# Patient Record
Sex: MALE | Race: ASIAN | NOT HISPANIC OR LATINO
[De-identification: names, ages, dates, MRNs, and addresses within clinical notes are randomized per-mention and may not be internally consistent; named-entity substitution may affect disease eponyms.]

---

## 2020-09-14 ENCOUNTER — APPOINTMENT (OUTPATIENT)
Dept: UROLOGY | Facility: CLINIC | Age: 80
End: 2020-09-14
Payer: MEDICARE

## 2020-09-14 VITALS
HEIGHT: 69 IN | HEART RATE: 77 BPM | DIASTOLIC BLOOD PRESSURE: 82 MMHG | BODY MASS INDEX: 27.11 KG/M2 | WEIGHT: 183 LBS | SYSTOLIC BLOOD PRESSURE: 195 MMHG

## 2020-09-14 VITALS — TEMPERATURE: 97.3 F

## 2020-09-14 DIAGNOSIS — R31.0 GROSS HEMATURIA: ICD-10-CM

## 2020-09-14 DIAGNOSIS — N52.9 MALE ERECTILE DYSFUNCTION, UNSPECIFIED: ICD-10-CM

## 2020-09-14 DIAGNOSIS — Z78.9 OTHER SPECIFIED HEALTH STATUS: ICD-10-CM

## 2020-09-14 DIAGNOSIS — Z86.79 PERSONAL HISTORY OF OTHER DISEASES OF THE CIRCULATORY SYSTEM: ICD-10-CM

## 2020-09-14 PROCEDURE — 99204 OFFICE O/P NEW MOD 45 MIN: CPT

## 2020-09-14 RX ORDER — ASPIRIN 81 MG
81 TABLET, DELAYED RELEASE (ENTERIC COATED) ORAL
Refills: 0 | Status: ACTIVE | COMMUNITY

## 2020-09-15 NOTE — LETTER BODY
[FreeTextEntry1] : John Willson MD\par [address] [facility]\[place] [address]\par \par Dear Dr. Willson,\par \par Canelo Mathews presents to the office today.  He is a 80-year-old man who is here today regarding evaluation for hematuria.  He initially had an episode of gross hematuria 2 weeks ago while urinating. Says he passed a few clots into the bowl but was not accompanied by pain or discomfort. No fever, chills, nausea/vomiting, or flank pain. Never had gross hematuria in the past. The gross hematuria resolved in approximately 4 days from that and he continued to be free of any urologic symptoms. He visited his primary doctor, Dr. Fazal Willson for evaluation of his gross hematuria early September. Worked up w/ UA demonstrating 60 RBC/hpf with no WBC or bacteria seen on 9/3/20. Had a renal/bladder ultrasound performed as well as part of workup demonstrating 2 right renal cysts, 3.9 cm benign appearing and a 1cm simple cyst w/ a 90cc prostate.  The patient also reports a secondary complaint of erectile dysfunction.  He says that he has noticed decline in his erectile function over the last several years.  He is interested in hearing about different treatment options.\par \par He today has  no dysuria, increased urinary frequency, increased urgency, or flank pain. He has no symptoms of urinary obstruction such as weak stream, urinary hesitancy, or frequent nocturia. No prior history of kidney stones or urologic malignancy. No family history of urologic or nonurologic malignancies. He is a former smoker, however quit almost 50 years ago. Is retired but was previously a  and has no known occupational chiemical exposures.\par \par Based on his history, risk factors, and recent gross hematuria, I would recommend further evaluation including CT urogram and cystoscopy to assess the upper and lower urinary tract for any evidence of malignancy or stone disease.  We also discussed the wide range of conditions potentially associated with hematuria including BPH, urinary tract infection, stones, malignancy within the urinary tract, vascular disease or inflammatory conditions.. I explained to him that while he has no symptoms of BPH, his large prostate may be the source of his bleeding. Will plan to repeat UA today in clinic with a cytology. Furthermore, will plan to better image the upper tract with a CT urogram as well as perform a cystoscopy. Counseled the patients regarding this plan and his questions were answered.\par \par I reviewed potential treatment options for the erectile dysfunction he described to me today.  I offered him initial treatment with an oral PDE 5 inhibitor although he states that he would prefer not to use any medical treatments at this time.  He will let me know of his decision changes.\par \par I will update you again once he has had the CT scan and cystoscopy.  Please do not hesitate to contact me with any questions or concerns and thank you very much for the kind referral.\par \par Sincerely,\par \par \par \par \par Aren Mcgill MD, FACS\par  of Urology\par Residency \par [facility]\par \par [facility]\par Director of Robotics and Minimally Invasive Surgery\par [address] [place] [address]\[place] [address]\par P: [phone]\par F: [phone]\par [facility]\par \par \par

## 2020-09-16 LAB
APPEARANCE: CLEAR
BACTERIA UR CULT: NORMAL
BACTERIA: NEGATIVE
BILIRUBIN URINE: NEGATIVE
BLOOD URINE: NORMAL
COLOR: YELLOW
GLUCOSE QUALITATIVE U: ABNORMAL
HYALINE CASTS: 0 /LPF
KETONES URINE: NEGATIVE
LEUKOCYTE ESTERASE URINE: ABNORMAL
MICROSCOPIC-UA: NORMAL
NITRITE URINE: NEGATIVE
PH URINE: 6
PROTEIN URINE: NORMAL
RED BLOOD CELLS URINE: 1 /HPF
SPECIFIC GRAVITY URINE: 1.02
SQUAMOUS EPITHELIAL CELLS: 0 /HPF
URINE CYTOLOGY: NORMAL
UROBILINOGEN URINE: NORMAL
WHITE BLOOD CELLS URINE: 12 /HPF

## 2020-09-17 ENCOUNTER — RESULT REVIEW (OUTPATIENT)
Age: 80
End: 2020-09-17

## 2020-09-17 ENCOUNTER — OUTPATIENT (OUTPATIENT)
Dept: OUTPATIENT SERVICES | Facility: HOSPITAL | Age: 80
LOS: 1 days | End: 2020-09-17
Payer: COMMERCIAL

## 2020-09-17 ENCOUNTER — APPOINTMENT (OUTPATIENT)
Dept: CT IMAGING | Facility: IMAGING CENTER | Age: 80
End: 2020-09-17
Payer: MEDICARE

## 2020-09-17 DIAGNOSIS — R31.0 GROSS HEMATURIA: ICD-10-CM

## 2020-09-17 PROCEDURE — 82565 ASSAY OF CREATININE: CPT

## 2020-09-17 PROCEDURE — 74178 CT ABD&PLV WO CNTR FLWD CNTR: CPT | Mod: 26

## 2020-09-17 PROCEDURE — 74178 CT ABD&PLV WO CNTR FLWD CNTR: CPT

## 2021-01-26 ENCOUNTER — APPOINTMENT (OUTPATIENT)
Dept: CARDIOLOGY | Facility: CLINIC | Age: 81
End: 2021-01-26
Payer: MEDICARE

## 2021-01-26 ENCOUNTER — NON-APPOINTMENT (OUTPATIENT)
Age: 81
End: 2021-01-26

## 2021-01-26 VITALS
WEIGHT: 183 LBS | HEART RATE: 77 BPM | OXYGEN SATURATION: 99 % | BODY MASS INDEX: 27.02 KG/M2 | DIASTOLIC BLOOD PRESSURE: 80 MMHG | SYSTOLIC BLOOD PRESSURE: 138 MMHG | TEMPERATURE: 97.8 F

## 2021-01-26 VITALS — SYSTOLIC BLOOD PRESSURE: 110 MMHG | DIASTOLIC BLOOD PRESSURE: 80 MMHG

## 2021-01-26 PROCEDURE — 93000 ELECTROCARDIOGRAM COMPLETE: CPT

## 2021-01-26 PROCEDURE — 99072 ADDL SUPL MATRL&STAF TM PHE: CPT

## 2021-01-26 PROCEDURE — 99205 OFFICE O/P NEW HI 60 MIN: CPT

## 2021-01-26 RX ORDER — METFORMIN HYDROCHLORIDE 500 MG/1
500 TABLET, COATED ORAL
Refills: 0 | Status: DISCONTINUED | COMMUNITY
End: 2021-01-26

## 2021-01-26 RX ORDER — SIMVASTATIN 20 MG/1
20 TABLET, FILM COATED ORAL
Refills: 0 | Status: DISCONTINUED | COMMUNITY
End: 2021-01-26

## 2021-01-27 NOTE — REVIEW OF SYSTEMS
[see HPI] : see HPI [Dyspnea on exertion] : dyspnea during exertion [Chest Pain] : chest pain [Palpitations] : palpitations [Negative] : Heme/Lymph

## 2021-01-27 NOTE — DISCUSSION/SUMMARY
[FreeTextEntry1] : He is a 72-year-old with a history of remote coronary artery disease status post stenting, diabetes mellitus, hypertension, hyperlipidemia and a recent episode of what appears to be a TIA.\par No residual neurologic deficits are seen today.\par He was referred to neurology.  We discussed the importance of consistent medical therapy and I stressed the need for him to take his aspirin on a daily basis.  Lipitor 40 mg was prescribed for his recent CNS event.\par Plan for echocardiography in order to exclude any structural abnormalities as the source of his TIA.\par Optimization of his medical issues is suggested prior to any further testing.\par Care plan reviewed with his daughter.\par I also reviewed his plan with Dr. Rk Mayo neurology.

## 2021-01-27 NOTE — HISTORY OF PRESENT ILLNESS
[FreeTextEntry1] : He sought cardiovascular evaluation.\par He was recently diagnosed with COVID-19 on January 2, 2021.  This past Sunday, he reported experiencing slurred speech possibly right facial droop that was noticed by his wife.  The episode lasted for less than 15 minutes and resolved spontaneously.  His blood pressure earlier that morning was reported as 170/112 with a blood sugar of 138 in the early morning.  He had not had an episode similar to this in the past.\par He has a history of coronary artery disease status post coronary stenting 10 years ago.  He is unclear whether this was in the setting of a myocardial infarction or not.\par He has been dealing with insulin requiring diabetes mellitus for 15 years.\par He is accompanied by his daughter who assists with translation.\par He has no history of congestive heart failure, known stroke and quit smoking many years ago.\par He takes medication for BPH and reports some lightheadedness or dizziness when standing up quickly.\par

## 2021-01-27 NOTE — PHYSICAL EXAM
[General Appearance - Well Developed] : well developed [General Appearance - Well Nourished] : well nourished [General Appearance - In No Acute Distress] : no acute distress [Normal Conjunctiva] : the conjunctiva exhibited no abnormalities [Normal Jugular Venous V Waves Present] : normal jugular venous V waves present [Heart Sounds] : normal S1 and S2 [Arterial Pulses Normal] : the arterial pulses were normal [Edema] : no peripheral edema present [Regular] : the rhythm was regular [Respiration, Rhythm And Depth] : normal respiratory rhythm and effort [Auscultation Breath Sounds / Voice Sounds] : lungs were clear to auscultation bilaterally [Abdomen Soft] : soft [Bowel Sounds] : normal bowel sounds [Abnormal Walk] : normal gait [Cyanosis, Localized] : no localized cyanosis [Skin Turgor] : normal skin turgor [Oriented To Time, Place, And Person] : oriented to person, place, and time [Affect] : the affect was normal

## 2021-02-04 ENCOUNTER — APPOINTMENT (OUTPATIENT)
Dept: VASCULAR SURGERY | Facility: CLINIC | Age: 81
End: 2021-02-04
Payer: MEDICARE

## 2021-02-04 VITALS
WEIGHT: 180 LBS | SYSTOLIC BLOOD PRESSURE: 147 MMHG | BODY MASS INDEX: 25.77 KG/M2 | TEMPERATURE: 97.81 F | HEART RATE: 69 BPM | DIASTOLIC BLOOD PRESSURE: 84 MMHG | HEIGHT: 70 IN

## 2021-02-04 VITALS — SYSTOLIC BLOOD PRESSURE: 138 MMHG | DIASTOLIC BLOOD PRESSURE: 79 MMHG | HEART RATE: 68 BPM

## 2021-02-04 PROCEDURE — 99072 ADDL SUPL MATRL&STAF TM PHE: CPT

## 2021-02-04 PROCEDURE — 99204 OFFICE O/P NEW MOD 45 MIN: CPT

## 2021-02-04 PROCEDURE — 93880 EXTRACRANIAL BILAT STUDY: CPT

## 2021-02-05 ENCOUNTER — OUTPATIENT (OUTPATIENT)
Dept: OUTPATIENT SERVICES | Facility: HOSPITAL | Age: 81
LOS: 1 days | End: 2021-02-05
Payer: COMMERCIAL

## 2021-02-05 ENCOUNTER — APPOINTMENT (OUTPATIENT)
Dept: CT IMAGING | Facility: IMAGING CENTER | Age: 81
End: 2021-02-05
Payer: MEDICARE

## 2021-02-05 ENCOUNTER — RESULT REVIEW (OUTPATIENT)
Age: 81
End: 2021-02-05

## 2021-02-05 DIAGNOSIS — I65.22 OCCLUSION AND STENOSIS OF LEFT CAROTID ARTERY: ICD-10-CM

## 2021-02-05 DIAGNOSIS — Z00.8 ENCOUNTER FOR OTHER GENERAL EXAMINATION: ICD-10-CM

## 2021-02-05 PROCEDURE — 70498 CT ANGIOGRAPHY NECK: CPT

## 2021-02-05 PROCEDURE — 82565 ASSAY OF CREATININE: CPT

## 2021-02-05 PROCEDURE — 70498 CT ANGIOGRAPHY NECK: CPT | Mod: 26

## 2021-02-17 ENCOUNTER — APPOINTMENT (OUTPATIENT)
Dept: CARDIOLOGY | Facility: CLINIC | Age: 81
End: 2021-02-17
Payer: MEDICARE

## 2021-02-17 ENCOUNTER — NON-APPOINTMENT (OUTPATIENT)
Age: 81
End: 2021-02-17

## 2021-02-17 VITALS
HEART RATE: 71 BPM | DIASTOLIC BLOOD PRESSURE: 80 MMHG | BODY MASS INDEX: 27.26 KG/M2 | WEIGHT: 190 LBS | SYSTOLIC BLOOD PRESSURE: 110 MMHG | OXYGEN SATURATION: 99 %

## 2021-02-17 VITALS — SYSTOLIC BLOOD PRESSURE: 130 MMHG | DIASTOLIC BLOOD PRESSURE: 80 MMHG

## 2021-02-17 DIAGNOSIS — Z01.810 ENCOUNTER FOR PREPROCEDURAL CARDIOVASCULAR EXAMINATION: ICD-10-CM

## 2021-02-17 PROCEDURE — 99072 ADDL SUPL MATRL&STAF TM PHE: CPT

## 2021-02-17 PROCEDURE — 99215 OFFICE O/P EST HI 40 MIN: CPT

## 2021-02-17 PROCEDURE — 93000 ELECTROCARDIOGRAM COMPLETE: CPT | Mod: NC

## 2021-02-17 NOTE — PHYSICAL EXAM
[General Appearance - Well Developed] : well developed [General Appearance - Well Nourished] : well nourished [General Appearance - In No Acute Distress] : no acute distress [Normal Conjunctiva] : the conjunctiva exhibited no abnormalities [Normal Jugular Venous V Waves Present] : normal jugular venous V waves present [Respiration, Rhythm And Depth] : normal respiratory rhythm and effort [Auscultation Breath Sounds / Voice Sounds] : lungs were clear to auscultation bilaterally [Heart Sounds] : normal S1 and S2 [Arterial Pulses Normal] : the arterial pulses were normal [Edema] : no peripheral edema present [Regular] : the rhythm was regular [Bowel Sounds] : normal bowel sounds [Abdomen Soft] : soft [Abnormal Walk] : normal gait [Cyanosis, Localized] : no localized cyanosis [Skin Turgor] : normal skin turgor [Oriented To Time, Place, And Person] : oriented to person, place, and time [Affect] : the affect was normal

## 2021-02-18 ENCOUNTER — APPOINTMENT (OUTPATIENT)
Dept: CARDIOLOGY | Facility: CLINIC | Age: 81
End: 2021-02-18
Payer: MEDICARE

## 2021-02-18 PROCEDURE — 99072 ADDL SUPL MATRL&STAF TM PHE: CPT

## 2021-02-18 PROCEDURE — 93306 TTE W/DOPPLER COMPLETE: CPT

## 2021-02-21 NOTE — ADDENDUM
[FreeTextEntry1] : Echocardiogram showed normal left ventricular systolic function without any out any significant valvular abnormalities.  There is mild to moderate aortic insufficiency without aortic stenosis.\par He may proceed with the planned vascular surgery/procedure.

## 2021-02-21 NOTE — DISCUSSION/SUMMARY
[FreeTextEntry1] : He is a 80-year-old with a history of remote coronary artery disease status post stenting, diabetes mellitus, hypertension, hyperlipidemia and a recent episode of what appears to be a TIA that is likely related to severe left-sided internal carotid artery stenosis.\par I agree that coronary stenting is appropriate.\par His ECG, with lateral biphasic T waves is unchanged from prior.\par No anginal symptoms and he is on optimal medical therapy for stable coronary atherosclerosis.\par Echocardiogram will exclude any structural heart disease or significant LV dysfunction that would complicate surgery.\par If no significant abnormalities are found he may proceed with the planned surgery as planned.\par This was reviewed with his daughter and Dr. Maharaj.\par

## 2021-02-21 NOTE — HISTORY OF PRESENT ILLNESS
[FreeTextEntry1] : He was seen today for cardiovascular evaluation prior to his planned carotid stenting.\par He has severe left carotid disease diagnosed by both ultrasound and CT scan.\par He is scheduled to undergo carotid stenting under general anesthesia with a direct carotid access approach.\par Patient reports feeling okay overall but, aside from occasional orthostatic episodes with borderline low blood pressure he has been feeling well.\par He reports taking the aspirin and Plavix as directed.\par He is able to walk 3 miles without any chest pains or shortness of breath.\par He has had no anginal symptoms since his stenting in 2009.\par Past medical history is notable for:\par He has been dealing with insulin requiring diabetes mellitus for 15 years.\par He has no history of congestive heart failure, known stroke and quit smoking many years ago.\par He takes medication for BPH and reports some lightheadedness or dizziness when standing up quickly.\par

## 2021-02-23 ENCOUNTER — OUTPATIENT (OUTPATIENT)
Dept: OUTPATIENT SERVICES | Facility: HOSPITAL | Age: 81
LOS: 1 days | End: 2021-02-23

## 2021-02-23 VITALS
RESPIRATION RATE: 16 BRPM | WEIGHT: 190.04 LBS | SYSTOLIC BLOOD PRESSURE: 137 MMHG | OXYGEN SATURATION: 98 % | HEIGHT: 68 IN | DIASTOLIC BLOOD PRESSURE: 78 MMHG | TEMPERATURE: 98 F | HEART RATE: 65 BPM

## 2021-02-23 DIAGNOSIS — I65.23 OCCLUSION AND STENOSIS OF BILATERAL CAROTID ARTERIES: ICD-10-CM

## 2021-02-23 DIAGNOSIS — Z98.890 OTHER SPECIFIED POSTPROCEDURAL STATES: Chronic | ICD-10-CM

## 2021-02-23 DIAGNOSIS — Z98.49 CATARACT EXTRACTION STATUS, UNSPECIFIED EYE: Chronic | ICD-10-CM

## 2021-02-23 DIAGNOSIS — I25.10 ATHEROSCLEROTIC HEART DISEASE OF NATIVE CORONARY ARTERY WITHOUT ANGINA PECTORIS: ICD-10-CM

## 2021-02-23 DIAGNOSIS — E11.9 TYPE 2 DIABETES MELLITUS WITHOUT COMPLICATIONS: ICD-10-CM

## 2021-02-23 DIAGNOSIS — I10 ESSENTIAL (PRIMARY) HYPERTENSION: ICD-10-CM

## 2021-02-23 LAB
A1C WITH ESTIMATED AVERAGE GLUCOSE RESULT: 9.3 % — HIGH (ref 4–5.6)
ANION GAP SERPL CALC-SCNC: 9 MMOL/L — SIGNIFICANT CHANGE UP (ref 7–14)
BLD GP AB SCN SERPL QL: NEGATIVE — SIGNIFICANT CHANGE UP
BUN SERPL-MCNC: 21 MG/DL — SIGNIFICANT CHANGE UP (ref 7–23)
CALCIUM SERPL-MCNC: 9.5 MG/DL — SIGNIFICANT CHANGE UP (ref 8.4–10.5)
CHLORIDE SERPL-SCNC: 104 MMOL/L — SIGNIFICANT CHANGE UP (ref 98–107)
CO2 SERPL-SCNC: 25 MMOL/L — SIGNIFICANT CHANGE UP (ref 22–31)
CREAT SERPL-MCNC: 1.09 MG/DL — SIGNIFICANT CHANGE UP (ref 0.5–1.3)
ESTIMATED AVERAGE GLUCOSE: 220 MG/DL — HIGH (ref 68–114)
GLUCOSE SERPL-MCNC: 112 MG/DL — HIGH (ref 70–99)
HCT VFR BLD CALC: 41 % — SIGNIFICANT CHANGE UP (ref 39–50)
HGB BLD-MCNC: 13.7 G/DL — SIGNIFICANT CHANGE UP (ref 13–17)
MCHC RBC-ENTMCNC: 29.7 PG — SIGNIFICANT CHANGE UP (ref 27–34)
MCHC RBC-ENTMCNC: 33.4 GM/DL — SIGNIFICANT CHANGE UP (ref 32–36)
MCV RBC AUTO: 88.7 FL — SIGNIFICANT CHANGE UP (ref 80–100)
NRBC # BLD: 0 /100 WBCS — SIGNIFICANT CHANGE UP
NRBC # FLD: 0 K/UL — SIGNIFICANT CHANGE UP
PA ADP PRP-ACNC: 35 PRU
PLATELET # BLD AUTO: 146 K/UL — LOW (ref 150–400)
POTASSIUM SERPL-MCNC: 4.3 MMOL/L — SIGNIFICANT CHANGE UP (ref 3.5–5.3)
POTASSIUM SERPL-SCNC: 4.3 MMOL/L — SIGNIFICANT CHANGE UP (ref 3.5–5.3)
RBC # BLD: 4.62 M/UL — SIGNIFICANT CHANGE UP (ref 4.2–5.8)
RBC # FLD: 12.8 % — SIGNIFICANT CHANGE UP (ref 10.3–14.5)
RH IG SCN BLD-IMP: POSITIVE — SIGNIFICANT CHANGE UP
SODIUM SERPL-SCNC: 138 MMOL/L — SIGNIFICANT CHANGE UP (ref 135–145)
WBC # BLD: 6.51 K/UL — SIGNIFICANT CHANGE UP (ref 3.8–10.5)
WBC # FLD AUTO: 6.51 K/UL — SIGNIFICANT CHANGE UP (ref 3.8–10.5)

## 2021-02-23 RX ORDER — SODIUM CHLORIDE 9 MG/ML
3 INJECTION INTRAMUSCULAR; INTRAVENOUS; SUBCUTANEOUS EVERY 8 HOURS
Refills: 0 | Status: DISCONTINUED | OUTPATIENT
Start: 2021-03-02 | End: 2021-03-03

## 2021-02-23 RX ORDER — VALSARTAN 80 MG/1
1 TABLET ORAL
Qty: 0 | Refills: 0 | DISCHARGE

## 2021-02-23 RX ORDER — SODIUM CHLORIDE 9 MG/ML
1000 INJECTION, SOLUTION INTRAVENOUS
Refills: 0 | Status: DISCONTINUED | OUTPATIENT
Start: 2021-03-02 | End: 2021-03-03

## 2021-02-23 NOTE — H&P PST ADULT - NEGATIVE GENERAL SYMPTOMS
no fever/no chills/no sweating/no anorexia/no weight loss/no polyphagia/no polyuria/no polydipsia/no fatigue

## 2021-02-23 NOTE — H&P PST ADULT - NSICDXPROBLEM_GEN_ALL_CORE_FT
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  preop instructions given, pt verbalized understanding  GI prophylaxis and chlorhexidine wash provided  pt is scheduled for COVID testing preop      Problem: HTN (hypertension)  Assessment and Plan: pt will take blood pressure meds as prescribed       Problem: Type 2 diabetes mellitus  Assessment and Plan: pt will hold glimepiride and januvia AM of surgery   he will reduce lantus by 20% night before surgery   accu check to be assessed on admission     Problem: CAD (coronary artery disease)  Assessment and Plan: pt will remain on low dose Aspirin due to presence of coronary stents       PROBLEM DIAGNOSES  Problem: 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  preop instructions given, pt verbalized understanding  GI prophylaxis and chlorhexidine wash provided  pt is scheduled for COVID testing preop  pt is currently on Plavix- Dr. Maharaj to give pt instructions regarding plavix use preop. voicemail left for surgical coordinator and email sent to surgeon- awaiting replies.    Problem: HTN (hypertension)  Assessment and Plan: pt will take blood pressure meds as prescribed     Problem: Type 2 diabetes mellitus  Assessment and Plan: pt will hold glimepiride and januvia AM of surgery   he will reduce lantus by 20% night before surgery   accu check to be assessed on admission     Problem: CAD (coronary artery disease)  Assessment and Plan: pt will remain on low dose Aspirin due to presence of coronary stents  cardiac clearance and ECHO on chart

## 2021-02-23 NOTE — H&P PST ADULT - HISTORY OF PRESENT ILLNESS
79 y/o male with HTN, HLD, Type 2 DM, CAD s/p PCI (10 years ago) presents to PST preop for left transcarotid artery revascularization with EEG monitoring. preop dx of occlusion and stenosis of bilateral carotid arteries. pt presented to his cardiologist reporting  slurred speech and facial droop. A carotid duplex revealed 90% stenosis of the left internal carotid artery. pt referred to vascular surgery for further evaluation. 81 y/o male with HTN, HLD, Type 2 DM, CAD s/p PCI x2 (10 years ago) and BPH presents to PST preop for left transcarotid artery revascularization with EEG monitoring. preop dx of occlusion and stenosis of bilateral carotid arteries. pt presented to his cardiologist reporting slurred speech and facial droop. A carotid duplex revealed 90% stenosis of the left internal carotid artery. pt referred to vascular surgery for further evaluation.

## 2021-02-23 NOTE — H&P PST ADULT - NSICDXPASTMEDICALHX_GEN_ALL_CORE_FT
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

## 2021-02-23 NOTE — H&P PST ADULT - NEGATIVE NEUROLOGICAL SYMPTOMS
no transient paralysis/no weakness/no generalized seizures/no focal seizures/no syncope/no tremors/no vertigo/no loss of sensation/no difficulty walking/no headache/no loss of consciousness/no hemiparesis/no confusion/no facial palsy

## 2021-02-23 NOTE — H&P PST ADULT - RS GEN PE MLT RESP DETAILS PC
airway patent/respirations non-labored/clear to auscultation bilaterally/no chest wall tenderness/no wheezes

## 2021-02-25 DIAGNOSIS — Z01.818 ENCOUNTER FOR OTHER PREPROCEDURAL EXAMINATION: ICD-10-CM

## 2021-02-26 PROBLEM — G62.9 POLYNEUROPATHY, UNSPECIFIED: Chronic | Status: ACTIVE | Noted: 2021-02-23

## 2021-02-26 PROBLEM — G45.9 TRANSIENT CEREBRAL ISCHEMIC ATTACK, UNSPECIFIED: Chronic | Status: ACTIVE | Noted: 2021-02-23

## 2021-02-26 PROBLEM — E78.5 HYPERLIPIDEMIA, UNSPECIFIED: Chronic | Status: ACTIVE | Noted: 2021-02-23

## 2021-02-26 PROBLEM — I65.23 OCCLUSION AND STENOSIS OF BILATERAL CAROTID ARTERIES: Chronic | Status: ACTIVE | Noted: 2021-02-23

## 2021-02-26 PROBLEM — U07.1 COVID-19: Chronic | Status: ACTIVE | Noted: 2021-02-23

## 2021-02-26 PROBLEM — Z95.5 PRESENCE OF CORONARY ANGIOPLASTY IMPLANT AND GRAFT: Chronic | Status: ACTIVE | Noted: 2021-02-23

## 2021-02-26 PROBLEM — E11.9 TYPE 2 DIABETES MELLITUS WITHOUT COMPLICATIONS: Chronic | Status: ACTIVE | Noted: 2021-02-23

## 2021-02-26 PROBLEM — N40.0 BENIGN PROSTATIC HYPERPLASIA WITHOUT LOWER URINARY TRACT SYMPTOMS: Chronic | Status: ACTIVE | Noted: 2021-02-23

## 2021-02-26 PROBLEM — I10 ESSENTIAL (PRIMARY) HYPERTENSION: Chronic | Status: ACTIVE | Noted: 2021-02-23

## 2021-02-26 PROBLEM — I25.10 ATHEROSCLEROTIC HEART DISEASE OF NATIVE CORONARY ARTERY WITHOUT ANGINA PECTORIS: Chronic | Status: ACTIVE | Noted: 2021-02-23

## 2021-02-27 ENCOUNTER — APPOINTMENT (OUTPATIENT)
Dept: DISASTER EMERGENCY | Facility: CLINIC | Age: 81
End: 2021-02-27

## 2021-02-28 LAB — SARS-COV-2 N GENE NPH QL NAA+PROBE: NOT DETECTED

## 2021-03-01 ENCOUNTER — TRANSCRIPTION ENCOUNTER (OUTPATIENT)
Age: 81
End: 2021-03-01

## 2021-03-01 NOTE — ASU PATIENT PROFILE, ADULT - PMH
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

## 2021-03-01 NOTE — ASU PATIENT PROFILE, ADULT - PSH
H/O arthroscopy  right knee  H/O cardiac catheterization  2 coronary stents  S/P cataract surgery

## 2021-03-02 ENCOUNTER — APPOINTMENT (OUTPATIENT)
Dept: VASCULAR SURGERY | Facility: HOSPITAL | Age: 81
End: 2021-03-02

## 2021-03-02 ENCOUNTER — INPATIENT (INPATIENT)
Facility: HOSPITAL | Age: 81
LOS: 0 days | Discharge: HOME | End: 2021-03-03
Attending: SURGERY | Admitting: SURGERY
Payer: MEDICARE

## 2021-03-02 VITALS
WEIGHT: 190.04 LBS | DIASTOLIC BLOOD PRESSURE: 76 MMHG | SYSTOLIC BLOOD PRESSURE: 129 MMHG | TEMPERATURE: 98 F | HEIGHT: 68 IN | RESPIRATION RATE: 16 BRPM | OXYGEN SATURATION: 99 % | HEART RATE: 68 BPM

## 2021-03-02 DIAGNOSIS — Z98.890 OTHER SPECIFIED POSTPROCEDURAL STATES: Chronic | ICD-10-CM

## 2021-03-02 DIAGNOSIS — I65.23 OCCLUSION AND STENOSIS OF BILATERAL CAROTID ARTERIES: ICD-10-CM

## 2021-03-02 DIAGNOSIS — Z98.49 CATARACT EXTRACTION STATUS, UNSPECIFIED EYE: Chronic | ICD-10-CM

## 2021-03-02 LAB
GAS PNL BLDA: SIGNIFICANT CHANGE UP
GLUCOSE BLDC GLUCOMTR-MCNC: 106 MG/DL — HIGH (ref 70–99)
GLUCOSE BLDC GLUCOMTR-MCNC: 81 MG/DL — SIGNIFICANT CHANGE UP (ref 70–99)
GLUCOSE BLDC GLUCOMTR-MCNC: 95 MG/DL — SIGNIFICANT CHANGE UP (ref 70–99)
GLUCOSE BLDC GLUCOMTR-MCNC: 96 MG/DL — SIGNIFICANT CHANGE UP (ref 70–99)
GLUCOSE BLDC GLUCOMTR-MCNC: 97 MG/DL — SIGNIFICANT CHANGE UP (ref 70–99)
RH IG SCN BLD-IMP: POSITIVE — SIGNIFICANT CHANGE UP

## 2021-03-02 PROCEDURE — 37215 TRANSCATH STENT CCA W/EPS: CPT

## 2021-03-02 RX ORDER — SODIUM CHLORIDE 9 MG/ML
1000 INJECTION, SOLUTION INTRAVENOUS
Refills: 0 | Status: DISCONTINUED | OUTPATIENT
Start: 2021-03-02 | End: 2021-03-03

## 2021-03-02 RX ORDER — TAMSULOSIN HYDROCHLORIDE 0.4 MG/1
1 CAPSULE ORAL
Qty: 0 | Refills: 0 | DISCHARGE

## 2021-03-02 RX ORDER — INSULIN LISPRO 100/ML
VIAL (ML) SUBCUTANEOUS
Refills: 0 | Status: DISCONTINUED | OUTPATIENT
Start: 2021-03-02 | End: 2021-03-03

## 2021-03-02 RX ORDER — ONDANSETRON 8 MG/1
4 TABLET, FILM COATED ORAL ONCE
Refills: 0 | Status: DISCONTINUED | OUTPATIENT
Start: 2021-03-02 | End: 2021-03-02

## 2021-03-02 RX ORDER — DEXTROSE 50 % IN WATER 50 %
12.5 SYRINGE (ML) INTRAVENOUS ONCE
Refills: 0 | Status: DISCONTINUED | OUTPATIENT
Start: 2021-03-02 | End: 2021-03-03

## 2021-03-02 RX ORDER — ASPIRIN/CALCIUM CARB/MAGNESIUM 324 MG
1 TABLET ORAL
Qty: 0 | Refills: 0 | DISCHARGE

## 2021-03-02 RX ORDER — ASPIRIN/CALCIUM CARB/MAGNESIUM 324 MG
81 TABLET ORAL DAILY
Refills: 0 | Status: DISCONTINUED | OUTPATIENT
Start: 2021-03-02 | End: 2021-03-03

## 2021-03-02 RX ORDER — CLOPIDOGREL BISULFATE 75 MG/1
75 TABLET, FILM COATED ORAL DAILY
Refills: 0 | Status: DISCONTINUED | OUTPATIENT
Start: 2021-03-02 | End: 2021-03-03

## 2021-03-02 RX ORDER — FENTANYL CITRATE 50 UG/ML
25 INJECTION INTRAVENOUS
Refills: 0 | Status: DISCONTINUED | OUTPATIENT
Start: 2021-03-02 | End: 2021-03-02

## 2021-03-02 RX ORDER — TAMSULOSIN HYDROCHLORIDE 0.4 MG/1
0.4 CAPSULE ORAL AT BEDTIME
Refills: 0 | Status: DISCONTINUED | OUTPATIENT
Start: 2021-03-02 | End: 2021-03-03

## 2021-03-02 RX ORDER — ATORVASTATIN CALCIUM 80 MG/1
40 TABLET, FILM COATED ORAL AT BEDTIME
Refills: 0 | Status: DISCONTINUED | OUTPATIENT
Start: 2021-03-02 | End: 2021-03-03

## 2021-03-02 RX ORDER — DEXTROSE 50 % IN WATER 50 %
15 SYRINGE (ML) INTRAVENOUS ONCE
Refills: 0 | Status: DISCONTINUED | OUTPATIENT
Start: 2021-03-02 | End: 2021-03-03

## 2021-03-02 RX ORDER — FENTANYL CITRATE 50 UG/ML
50 INJECTION INTRAVENOUS ONCE
Refills: 0 | Status: DISCONTINUED | OUTPATIENT
Start: 2021-03-02 | End: 2021-03-02

## 2021-03-02 RX ORDER — GLUCAGON INJECTION, SOLUTION 0.5 MG/.1ML
1 INJECTION, SOLUTION SUBCUTANEOUS ONCE
Refills: 0 | Status: DISCONTINUED | OUTPATIENT
Start: 2021-03-02 | End: 2021-03-03

## 2021-03-02 RX ORDER — DEXTROSE 50 % IN WATER 50 %
25 SYRINGE (ML) INTRAVENOUS ONCE
Refills: 0 | Status: DISCONTINUED | OUTPATIENT
Start: 2021-03-02 | End: 2021-03-03

## 2021-03-02 RX ORDER — ENOXAPARIN SODIUM 100 MG/ML
25 INJECTION SUBCUTANEOUS
Qty: 0 | Refills: 0 | DISCHARGE

## 2021-03-02 RX ORDER — ATORVASTATIN CALCIUM 80 MG/1
1 TABLET, FILM COATED ORAL
Qty: 0 | Refills: 0 | DISCHARGE

## 2021-03-02 RX ORDER — VALSARTAN 80 MG/1
1 TABLET ORAL
Qty: 0 | Refills: 0 | DISCHARGE

## 2021-03-02 RX ORDER — VALSARTAN 80 MG/1
80 TABLET ORAL AT BEDTIME
Refills: 0 | Status: DISCONTINUED | OUTPATIENT
Start: 2021-03-02 | End: 2021-03-03

## 2021-03-02 RX ORDER — CLOPIDOGREL BISULFATE 75 MG/1
1 TABLET, FILM COATED ORAL
Qty: 0 | Refills: 0 | DISCHARGE

## 2021-03-02 RX ADMIN — ATORVASTATIN CALCIUM 40 MILLIGRAM(S): 80 TABLET, FILM COATED ORAL at 21:36

## 2021-03-02 RX ADMIN — Medication 81 MILLIGRAM(S): at 14:23

## 2021-03-02 RX ADMIN — VALSARTAN 80 MILLIGRAM(S): 80 TABLET ORAL at 21:36

## 2021-03-02 RX ADMIN — TAMSULOSIN HYDROCHLORIDE 0.4 MILLIGRAM(S): 0.4 CAPSULE ORAL at 21:36

## 2021-03-02 RX ADMIN — CLOPIDOGREL BISULFATE 75 MILLIGRAM(S): 75 TABLET, FILM COATED ORAL at 16:15

## 2021-03-02 RX ADMIN — SODIUM CHLORIDE 3 MILLILITER(S): 9 INJECTION INTRAMUSCULAR; INTRAVENOUS; SUBCUTANEOUS at 21:35

## 2021-03-02 NOTE — BRIEF OPERATIVE NOTE - NSICDXBRIEFPOSTOP_GEN_ALL_CORE_FT
POST-OP DIAGNOSIS:  Symptomatic stenosis of left carotid artery 02-Mar-2021 10:55:05  Rd Chowdhury

## 2021-03-02 NOTE — BRIEF OPERATIVE NOTE - OPERATION/FINDINGS
Left transcarotid arterial revascularization with carotid stent performed with assistance of [employer]™ Neuroprotection System.

## 2021-03-02 NOTE — CHART NOTE - NSCHARTNOTEFT_GEN_A_CORE
Post Operative Note  Patient: YADIRA SALTER 80y (1940) Male   MRN: [id number]  Location: [address]  Visit: 03-02-21 Inpatient  Date: 03-02-21 @ 19:36    Procedure: S/P ***    Subjective:       Objective:  Vitals: T(F): 97.8 (03-02-21 @ 17:10), Max: 97.9 (03-02-21 @ 06:57)  HR: 58 (03-02-21 @ 17:10)  BP: 111/57 (03-02-21 @ 17:10) (85/50 - 133/73)  RR: 16 (03-02-21 @ 17:10)  SpO2: 100% (03-02-21 @ 17:10)  Vent Settings:     In:   03-02-21 @ 07:01  -  03-02-21 @ 19:36  --------------------------------------------------------  IN: 1640 mL      IV Fluids: dextrose 5%. 1000 milliLiter(s) (50 mL/Hr) IV Continuous <Continuous>  dextrose 5%. 1000 milliLiter(s) (100 mL/Hr) IV Continuous <Continuous>  lactated ringers. 1000 milliLiter(s) (100 mL/Hr) IV Continuous <Continuous>  lactated ringers. 1000 milliLiter(s) (30 mL/Hr) IV Continuous <Continuous>  sodium chloride 0.9% lock flush 3 milliLiter(s) IV Push every 8 hours      Out:   03-02-21 @ 07:01  -  03-02-21 @ 19:36  --------------------------------------------------------  OUT: 275 mL      EBL:   03-02-21 @ 07:01  -  03-02-21 @ 19:36  --------------------------------------------------------  OUT: 25 mL      Voided Urine:   03-02-21 @ 07:01  -  03-02-21 @ 19:36  --------------------------------------------------------  OUT: 275 mL      Physical Examination:  General: NAD, resting comfortably in bed  HEENT: Normocephalic  L Neck incision c/d/i, no ecchymosis or induration.  Respiratory: Nonlabored respirations, normal CW expansion.  Cardio: S1S2, regular rate and rhythm.  Abdomen: soft, mild suprapubic distension, nontender, R groin incision with overlying dressing c/d/i  Vascular: extremities are warm and well perfused.     Imaging:  No post-op imaging studies    Assessment:  80yMale patient s/p L TCAR    Plan:  SBP goal 110-160  Pain control PRN  CLD, ADAT  follow up TOV, bladder scan... patient on flow max at home will hold for now given borderline BP ~110.      Date/Time: 03-02-21 @ 17:00 Post Operative Note  Patient: YADIRA SALTER 80y (1940) Male   MRN: [id number]  Location: [place] [address] [address]  Visit: 03-02-21 Inpatient  Date: 03-02-21 @ 17:00    Procedure: S/P L TCAR    Subjective: Patient seen and examined post operatively. Patient without complaints at this time. Denies focal numbness, tingling, weakness, changes in speech or vision, dyspnea, difficulty tolerating secretions, chest pain, SOB, cough.    Objective:  Vitals: T(F): 97.8 (03-02-21 @ 17:10), Max: 97.9 (03-02-21 @ 06:57)  HR: 58 (03-02-21 @ 17:10)  BP: 111/57 (03-02-21 @ 17:10) (85/50 - 133/73)  RR: 16 (03-02-21 @ 17:10)  SpO2: 100% (03-02-21 @ 17:10)  Vent Settings:     In:   03-02-21 @ 07:01  -  03-02-21 @ 19:36  --------------------------------------------------------  IN: 1640 mL      IV Fluids: dextrose 5%. 1000 milliLiter(s) (50 mL/Hr) IV Continuous <Continuous>  dextrose 5%. 1000 milliLiter(s) (100 mL/Hr) IV Continuous <Continuous>  lactated ringers. 1000 milliLiter(s) (100 mL/Hr) IV Continuous <Continuous>  lactated ringers. 1000 milliLiter(s) (30 mL/Hr) IV Continuous <Continuous>  sodium chloride 0.9% lock flush 3 milliLiter(s) IV Push every 8 hours      Out:   03-02-21 @ 07:01  -  03-02-21 @ 19:36  --------------------------------------------------------  OUT: 275 mL      EBL:   03-02-21 @ 07:01  -  03-02-21 @ 19:36  --------------------------------------------------------  OUT: 25 mL      Voided Urine:   03-02-21 @ 07:01  -  03-02-21 @ 19:36  --------------------------------------------------------  OUT: 275 mL      Physical Examination:  General: NAD, resting comfortably in bed  HEENT: Normocephalic  L Neck incision c/d/i, no ecchymosis or induration.  Respiratory: Nonlabored respirations, normal CW expansion.  Cardio: S1S2, regular rate and rhythm.  Abdomen: soft, mild suprapubic distension, nontender, R groin incision with overlying dressing c/d/i  Vascular: extremities are warm and well perfused.   Neuro: nonfocal, 5/5 strength BUE/BLE, symmetric smile, no drift    Imaging:  No post-op imaging studies    Assessment:  80yMale patient s/p L TCAR    Plan:  SBP goal 110-160  Pain control PRN  CLD, ADAT  follow up TOV, bladder scan... patient on flow max at home will hold for now given borderline BP ~110.      Date/Time: 03-02-21 @ 17:00

## 2021-03-03 ENCOUNTER — TRANSCRIPTION ENCOUNTER (OUTPATIENT)
Age: 81
End: 2021-03-03

## 2021-03-03 VITALS
DIASTOLIC BLOOD PRESSURE: 68 MMHG | SYSTOLIC BLOOD PRESSURE: 126 MMHG | TEMPERATURE: 98 F | OXYGEN SATURATION: 100 % | HEART RATE: 59 BPM | RESPIRATION RATE: 17 BRPM

## 2021-03-03 LAB
ANION GAP SERPL CALC-SCNC: 6 MMOL/L — LOW (ref 7–14)
BUN SERPL-MCNC: 17 MG/DL — SIGNIFICANT CHANGE UP (ref 7–23)
CALCIUM SERPL-MCNC: 9.1 MG/DL — SIGNIFICANT CHANGE UP (ref 8.4–10.5)
CHLORIDE SERPL-SCNC: 100 MMOL/L — SIGNIFICANT CHANGE UP (ref 98–107)
CO2 SERPL-SCNC: 29 MMOL/L — SIGNIFICANT CHANGE UP (ref 22–31)
CREAT SERPL-MCNC: 1.31 MG/DL — HIGH (ref 0.5–1.3)
GLUCOSE BLDC GLUCOMTR-MCNC: 111 MG/DL — HIGH (ref 70–99)
GLUCOSE BLDC GLUCOMTR-MCNC: 164 MG/DL — HIGH (ref 70–99)
GLUCOSE SERPL-MCNC: 108 MG/DL — HIGH (ref 70–99)
HCT VFR BLD CALC: 37.8 % — LOW (ref 39–50)
HGB BLD-MCNC: 12 G/DL — LOW (ref 13–17)
MAGNESIUM SERPL-MCNC: 1.8 MG/DL — SIGNIFICANT CHANGE UP (ref 1.6–2.6)
MCHC RBC-ENTMCNC: 29.3 PG — SIGNIFICANT CHANGE UP (ref 27–34)
MCHC RBC-ENTMCNC: 31.7 GM/DL — LOW (ref 32–36)
MCV RBC AUTO: 92.4 FL — SIGNIFICANT CHANGE UP (ref 80–100)
NRBC # BLD: 0 /100 WBCS — SIGNIFICANT CHANGE UP
NRBC # FLD: 0 K/UL — SIGNIFICANT CHANGE UP
PHOSPHATE SERPL-MCNC: 4.3 MG/DL — SIGNIFICANT CHANGE UP (ref 2.5–4.5)
PLATELET # BLD AUTO: 113 K/UL — LOW (ref 150–400)
POTASSIUM SERPL-MCNC: 4.4 MMOL/L — SIGNIFICANT CHANGE UP (ref 3.5–5.3)
POTASSIUM SERPL-SCNC: 4.4 MMOL/L — SIGNIFICANT CHANGE UP (ref 3.5–5.3)
RBC # BLD: 4.09 M/UL — LOW (ref 4.2–5.8)
RBC # FLD: 13 % — SIGNIFICANT CHANGE UP (ref 10.3–14.5)
SODIUM SERPL-SCNC: 135 MMOL/L — SIGNIFICANT CHANGE UP (ref 135–145)
WBC # BLD: 5.73 K/UL — SIGNIFICANT CHANGE UP (ref 3.8–10.5)
WBC # FLD AUTO: 5.73 K/UL — SIGNIFICANT CHANGE UP (ref 3.8–10.5)

## 2021-03-03 RX ORDER — MAGNESIUM SULFATE 500 MG/ML
2 VIAL (ML) INJECTION ONCE
Refills: 0 | Status: COMPLETED | OUTPATIENT
Start: 2021-03-03 | End: 2021-03-03

## 2021-03-03 RX ORDER — SODIUM CHLORIDE 9 MG/ML
500 INJECTION, SOLUTION INTRAVENOUS ONCE
Refills: 0 | Status: COMPLETED | OUTPATIENT
Start: 2021-03-03 | End: 2021-03-03

## 2021-03-03 RX ADMIN — CLOPIDOGREL BISULFATE 75 MILLIGRAM(S): 75 TABLET, FILM COATED ORAL at 17:34

## 2021-03-03 RX ADMIN — Medication 81 MILLIGRAM(S): at 11:58

## 2021-03-03 RX ADMIN — Medication 1: at 12:37

## 2021-03-03 RX ADMIN — SODIUM CHLORIDE 3 MILLILITER(S): 9 INJECTION INTRAMUSCULAR; INTRAVENOUS; SUBCUTANEOUS at 12:38

## 2021-03-03 RX ADMIN — SODIUM CHLORIDE 500 MILLILITER(S): 9 INJECTION, SOLUTION INTRAVENOUS at 11:58

## 2021-03-03 RX ADMIN — Medication 50 GRAM(S): at 12:37

## 2021-03-03 RX ADMIN — SODIUM CHLORIDE 3 MILLILITER(S): 9 INJECTION INTRAMUSCULAR; INTRAVENOUS; SUBCUTANEOUS at 05:43

## 2021-03-03 NOTE — DISCHARGE NOTE PROVIDER - CARE PROVIDER_API CALL
Joe Maharaj)  Surgery; Vascular Surgery  [phone] [place] [address]  Phone: [phone]  Fax: [phone]  Follow Up Time:

## 2021-03-03 NOTE — DISCHARGE NOTE PROVIDER - HOSPITAL COURSE
Patient was admitted to undergo Transcarotid artery revascularization. They tolerated the procedure well and were transferred to the PACU in stable condition. After a monitoring period, he was then transferred to the surgical floor. The patient remained hemodynamically stable throughout their post-op course. On POD #1, patient was tolerating diet, voiding without difficulty, ambulating without assistance, and pain was well-controlled. They were then found to be stable for discharge to home and will follow-up as outpatient for post-op visit.

## 2021-03-03 NOTE — DISCHARGE NOTE PROVIDER - NSDCMRMEDTOKEN_GEN_ALL_CORE_FT
aspirin 81 mg oral tablet: 1 tab(s) orally once a day  atorvastatin 40 mg oral tablet: 1 tab(s) orally once a day (at bedtime)  clopidogrel 75 mg oral tablet: 1 tab(s) orally once a day  glimepiride 2 mg oral tablet: 1 tab(s) orally once a day-with breakfast  Januvia 50 mg oral tablet: 1 tab(s) orally once a day  Lantus Solostar Pen 100 units/mL subcutaneous solution: 25 unit(s) subcutaneous once a day (at bedtime)  tamsulosin 0.4 mg oral capsule: 1 cap(s) orally once a day  valsartan 80 mg oral tablet: 1 tab(s) orally once a day (at bedtime)

## 2021-03-03 NOTE — DISCHARGE NOTE PROVIDER - NSDCCPCAREPLAN_GEN_ALL_CORE_FT
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

## 2021-03-03 NOTE — PROVIDER CONTACT NOTE (OTHER) - SITUATION
patient BP is 86/46 HR 63
Pt bladder scanned reservoir showed 844, provider made aware, straight cath order, pt refused and pt voided 250ml.

## 2021-03-03 NOTE — CHART NOTE - NSCHARTNOTEFT_GEN_A_CORE
Patient with BP of 86 this am and was asymptomatic and ambulating. Patient received 500 cc bolus bp increased to 98/48. As per patient bp records that patient had in hand his bp is normally . Discussed with vascular PA and patient is ok to be dc with follow up with Dr Maharaj in 2 weeks. Patient with BP of 86 this am and was asymptomatic and ambulating. Patient received 500 cc bolus bp increased to 98/48. As per patient bp records that patient had in hand his bp is normally .  Patient is voiding but not saving voids. Discussed with vascular PA and patient is ok to be dc with follow up with Dr Maharaj in 2 weeks.

## 2021-03-03 NOTE — DISCHARGE NOTE PROVIDER - NSDCCPTREATMENT_GEN_ALL_CORE_FT
PRINCIPAL PROCEDURE  Procedure: Transcarotid artery revascularization  Findings and Treatment:

## 2021-03-03 NOTE — PROVIDER CONTACT NOTE (OTHER) - ACTION/TREATMENT ORDERED:
Provider aware. will come to be bedside to speak with patient and then follow up with nurse for next action.
Ellis BONILLA notified. , will order 250ml bolus, encourage po fluids.  and continue to monitor.

## 2021-03-03 NOTE — PROVIDER CONTACT NOTE (OTHER) - BACKGROUND
Pt s/p Left transcarotid arterial revascularization with carotid stent
patient had left transcarotid revascularization with stent placement.

## 2021-03-03 NOTE — DISCHARGE NOTE PROVIDER - NSDCFUADDINST_GEN_ALL_CORE_FT
WOUND CARE:  Please keep incisions clean and dry. Please do not Scrub or rub incisions. Do not use lotion or powder on incisions.   BATHING: You may shower and/or sponge bathe. You may use warm soapy water in the shower and rinse, pat dry.  ACTIVITY: No heavy lifting or straining. Otherwise, you may return to your usual level of physical activity. If you are taking narcotic pain medication DO NOT drive a car, operate machinery or make important decisions.  DIET: Return to your usual diet.  NOTIFY YOUR SURGEON IF YOU HAVE: any bleeding that does not stop, any pus draining from your wound(s), any fever (over 100.4 F) persistent nausea/vomiting, or if your pain is not controlled on your discharge pain medications, unable to urinate.  Please follow up with your primary care physician in one week regarding your hospitalization, bring copies of your discharge paperwork.  Please follow up with your surgeon, Dr. Maharaj as an outpatient, please call to schedule appointment

## 2021-03-03 NOTE — PROVIDER CONTACT NOTE (OTHER) - ASSESSMENT
patient is A&OX 4 on room air. currently in bed. patient denies dizziness , shortness of breath , no blood in urine. no signs and symptoms of bleeding noted.
Pt A&Ox4, remained hemodynamically stable and vital signs within normal limits.

## 2021-03-03 NOTE — PROGRESS NOTE ADULT - SUBJECTIVE AND OBJECTIVE BOX
81 yo M POD 1 for L TCAR (h/o TIA - no residual deficits)  Feels well overall  Mild soreness at neck incision site  Eating breakfast, denies n/v/abdominal pain  +flatus and urine     P/E:  NAD  L supraclavicular incision c/d/i  R groin incisions soiled with blood but dry, no swelling or tenderness  no focal neurologic deficits    Patient doing well s/p TCAR. D/C home today. Continue with antiplatelets and statin. Post-op wound care instructions and precautions discussed. Follow up with Dr. Maharaj's office in 2 weeks. Patient seen and examined with Dr. Maharaj. 
ANESTHESIA POSTOP CHECK    80y Male POSTOP DAY 1     No COMPLAINTS    NO APPARENT ANESTHESIA COMPLICATIONS

## 2021-03-03 NOTE — DISCHARGE NOTE NURSING/CASE MANAGEMENT/SOCIAL WORK - PATIENT PORTAL LINK FT
You can access the FollowMyHealth Patient Portal offered by [facility] by registering at the following website: [email or web address]. By joining [employer]s FollowMyHealth portal, you will also be able to view your health information using other applications (apps) compatible with our system.

## 2021-03-05 ENCOUNTER — APPOINTMENT (OUTPATIENT)
Dept: CARDIOLOGY | Facility: CLINIC | Age: 81
End: 2021-03-05

## 2021-03-09 ENCOUNTER — NON-APPOINTMENT (OUTPATIENT)
Age: 81
End: 2021-03-09

## 2021-03-09 ENCOUNTER — APPOINTMENT (OUTPATIENT)
Dept: CARDIOLOGY | Facility: CLINIC | Age: 81
End: 2021-03-09
Payer: MEDICARE

## 2021-03-09 VITALS
DIASTOLIC BLOOD PRESSURE: 60 MMHG | HEART RATE: 70 BPM | OXYGEN SATURATION: 95 % | HEIGHT: 70 IN | WEIGHT: 193 LBS | SYSTOLIC BLOOD PRESSURE: 110 MMHG | BODY MASS INDEX: 27.63 KG/M2

## 2021-03-09 DIAGNOSIS — Z86.39 PERSONAL HISTORY OF OTHER ENDOCRINE, NUTRITIONAL AND METABOLIC DISEASE: ICD-10-CM

## 2021-03-09 PROCEDURE — 99072 ADDL SUPL MATRL&STAF TM PHE: CPT

## 2021-03-09 PROCEDURE — 93000 ELECTROCARDIOGRAM COMPLETE: CPT

## 2021-03-09 PROCEDURE — 99214 OFFICE O/P EST MOD 30 MIN: CPT

## 2021-03-09 NOTE — DISCUSSION/SUMMARY
[FreeTextEntry1] : He is a 80-year-old with a history of remote coronary artery disease status post stenting, diabetes mellitus, hypertension, hyperlipidemia and a TIA, now s/p left-sided internal carotid artery stenting.\par Doing well on aspirin/plavix/high dose statin.\par We reviewed the benfits of ARB for renal protection in pts with DM.\par Suggested trying diovan 40 qhs.\par call if symptoms of dizziness are felt.\par ECG seems to have improved s/p carotid stenting!?!\par See me in 3 months.

## 2021-03-18 ENCOUNTER — APPOINTMENT (OUTPATIENT)
Dept: VASCULAR SURGERY | Facility: CLINIC | Age: 81
End: 2021-03-18
Payer: MEDICARE

## 2021-03-18 VITALS
SYSTOLIC BLOOD PRESSURE: 135 MMHG | HEIGHT: 68.5 IN | DIASTOLIC BLOOD PRESSURE: 78 MMHG | WEIGHT: 190 LBS | HEART RATE: 66 BPM | BODY MASS INDEX: 28.46 KG/M2

## 2021-03-18 VITALS — SYSTOLIC BLOOD PRESSURE: 114 MMHG | DIASTOLIC BLOOD PRESSURE: 72 MMHG | TEMPERATURE: 97.5 F | HEART RATE: 71 BPM

## 2021-03-18 PROCEDURE — 99024 POSTOP FOLLOW-UP VISIT: CPT

## 2021-03-18 NOTE — REVIEW OF SYSTEMS
[Fever] : no fever [Chills] : no chills [Shortness Of Breath] : no shortness of breath [Abdominal Pain] : no abdominal pain

## 2021-03-18 NOTE — PHYSICAL EXAM
[de-identified] : well appearing male, NAD  [de-identified] : L TCAR incision well healed, no swelling/erythema/discharge [de-identified] : unlabored

## 2021-03-18 NOTE — HISTORY OF PRESENT ILLNESS
[FreeTextEntry1] : YADIRA SALTER is a 80 year old male who is 2 weeks s/p L TCAR (3/2/2021). He is accompanied by his daughter, Melida, today. Patient reports feeling well overall. C/O mild L sided neck stiffness. Denies fever, chills, pain, swelling. H/O TIA in January 2021 (facial droop and slurred speech). Denies focal neurologic deficits including amaurosis fugax, slurred speech, and weakness in the arms/legs. He is on Plavix and atorvastatin. \par \par PMH: HTN, CAD s/p coronary stent, IDDM, HLD, BPH, peripheral neuropathy, bilateral lens placement, carotid artery disease s/p L TCAR (3/2021)

## 2021-03-18 NOTE — ASSESSMENT
[FreeTextEntry1] : YADIRA SALTER is a 80 year old male who is s/p L TCAR for TIA. He is doing well and recovering without issues. Follow up in 1 month for post-op carotid ultrasound.

## 2021-04-15 ENCOUNTER — APPOINTMENT (OUTPATIENT)
Dept: VASCULAR SURGERY | Facility: CLINIC | Age: 81
End: 2021-04-15
Payer: MEDICARE

## 2021-04-15 VITALS — TEMPERATURE: 97.3 F | HEART RATE: 62 BPM | DIASTOLIC BLOOD PRESSURE: 74 MMHG | SYSTOLIC BLOOD PRESSURE: 122 MMHG

## 2021-04-15 VITALS
HEIGHT: 68.5 IN | DIASTOLIC BLOOD PRESSURE: 73 MMHG | WEIGHT: 180 LBS | SYSTOLIC BLOOD PRESSURE: 142 MMHG | BODY MASS INDEX: 26.97 KG/M2 | HEART RATE: 66 BPM

## 2021-04-15 PROCEDURE — 93880 EXTRACRANIAL BILAT STUDY: CPT

## 2021-04-15 PROCEDURE — 99072 ADDL SUPL MATRL&STAF TM PHE: CPT

## 2021-04-15 PROCEDURE — 99024 POSTOP FOLLOW-UP VISIT: CPT

## 2021-04-21 NOTE — ASSESSMENT
[FreeTextEntry1] : YADIRA SALTER is a 80 year old male who is doing well s/p L TCAR for TIA. Continue with current management. Follow up in 6 months for carotid ultrasound and telehealth visit.

## 2021-04-21 NOTE — PHYSICAL EXAM
[Alert] : alert [Oriented to Person] : oriented to person [Oriented to Place] : oriented to place [Oriented to Time] : oriented to time [Calm] : calm [de-identified] : well appearing male, NAD  [de-identified] : unlabored [de-identified] : L TCAR incision well healed

## 2021-04-21 NOTE — REVIEW OF SYSTEMS
[As noted in HPI] : as noted in HPI [Chest Pain] : no chest pain [As Noted in HPI] : as noted in HPI

## 2021-04-21 NOTE — HISTORY OF PRESENT ILLNESS
[FreeTextEntry1] : YADIRA SALTER is a 80 year old male who is here for his initial post-op carotid ultrasound. He is s/p L TCAR (3/2/2021). Patient reports feeling well overall. Denies fever, chills, pain, swelling. Has a h/o TIA in January 2021 (facial droop and slurred speech). Denies new focal neurologic deficits including amaurosis fugax, slurred speech, and weakness in the arms/legs. Denies lightheadedness/dizziness. He is on Plavix and atorvastatin. \par \par PMH: HTN, CAD s/p coronary stent, IDDM, HLD, BPH, peripheral neuropathy, bilateral lens placement, carotid artery disease s/p L TCAR (3/2021)\par \par Carotid ultrasound from today demonstrates mild disease of R ICA. R vertebral with oscillating flow. L ICA with patent stent and no flow restrictive lesions.

## 2021-04-23 ENCOUNTER — APPOINTMENT (OUTPATIENT)
Dept: ULTRASOUND IMAGING | Facility: IMAGING CENTER | Age: 81
End: 2021-04-23
Payer: MEDICARE

## 2021-04-23 ENCOUNTER — OUTPATIENT (OUTPATIENT)
Dept: OUTPATIENT SERVICES | Facility: HOSPITAL | Age: 81
LOS: 1 days | End: 2021-04-23
Payer: COMMERCIAL

## 2021-04-23 DIAGNOSIS — Z00.8 ENCOUNTER FOR OTHER GENERAL EXAMINATION: ICD-10-CM

## 2021-04-23 DIAGNOSIS — Z98.890 OTHER SPECIFIED POSTPROCEDURAL STATES: Chronic | ICD-10-CM

## 2021-04-23 DIAGNOSIS — Z98.49 CATARACT EXTRACTION STATUS, UNSPECIFIED EYE: Chronic | ICD-10-CM

## 2021-04-23 PROCEDURE — 76770 US EXAM ABDO BACK WALL COMP: CPT | Mod: 26

## 2021-04-23 PROCEDURE — 76770 US EXAM ABDO BACK WALL COMP: CPT

## 2021-05-19 ENCOUNTER — APPOINTMENT (OUTPATIENT)
Dept: CARDIOLOGY | Facility: CLINIC | Age: 81
End: 2021-05-19
Payer: MEDICARE

## 2021-05-19 ENCOUNTER — NON-APPOINTMENT (OUTPATIENT)
Age: 81
End: 2021-05-19

## 2021-05-19 VITALS
OXYGEN SATURATION: 98 % | SYSTOLIC BLOOD PRESSURE: 122 MMHG | WEIGHT: 184 LBS | DIASTOLIC BLOOD PRESSURE: 70 MMHG | BODY MASS INDEX: 27.57 KG/M2 | TEMPERATURE: 97.6 F | HEART RATE: 59 BPM

## 2021-05-19 PROCEDURE — 93000 ELECTROCARDIOGRAM COMPLETE: CPT

## 2021-05-19 PROCEDURE — 99214 OFFICE O/P EST MOD 30 MIN: CPT

## 2021-05-19 NOTE — DISCUSSION/SUMMARY
[FreeTextEntry1] : He is a 80-year-old with a history of remote coronary artery disease status post stenting, diabetes mellitus, hypertension, hyperlipidemia and a TIA, now status post left internal carotid stenting.  He presents with relatively new exertional shortness of breath, though he has not had any routine aerobic exercise since January when he had Covid.\par He is seeing pulmonary tomorrow to exclude a pulmonary source for his dyspnea.\par Coronary atherosclerosis is certainly possible and, given his abnormal ECG I have scheduled him for an exercise stress test with nuclear imaging to exclude severe atherosclerosis as a source of his symptoms.\par If no stenosis severe ischemia is found I would encourage him to begin routine aerobic exercise and that his symptoms are likely due to deconditioning.

## 2021-05-19 NOTE — HISTORY OF PRESENT ILLNESS
[FreeTextEntry1] : He is accompanied by his daughter.\par He reports that he has been having shortness of breath when he is taking in his garden.  There is no associated chest pain.  He has no orthopnea, PND but does note leg edema at the end of the day.  He was told to drink lots of water because of his renal insufficiency.\par He is taking his medications as directed.\par \par Prior:\par He was seen today for cardiovascular evaluation prior to his planned carotid stenting.\par He has severe left carotid disease diagnosed by both ultrasound and CT scan.\par He is scheduled to undergo carotid stenting under general anesthesia with a direct carotid access approach.\par Patient reports feeling okay overall but, aside from occasional orthostatic episodes with borderline low blood pressure he has been feeling well.\par He reports taking the aspirin and Plavix as directed.\par He is able to walk 3 miles without any chest pains or shortness of breath.\par He has had no anginal symptoms since his stenting in 2009.\par Past medical history is notable for:\par He has been dealing with insulin requiring diabetes mellitus for 15 years.\par He has no history of congestive heart failure, known stroke and quit smoking many years ago.\par He takes medication for BPH and reports some lightheadedness or dizziness when standing up quickly.\par

## 2021-05-19 NOTE — CARDIOLOGY SUMMARY
[de-identified] : Echocardiogram showed normal left ventricular systolic function without any out any significant valvular abnormalities.  There is mild to moderate aortic insufficiency without aortic stenosis.\par

## 2021-05-20 ENCOUNTER — APPOINTMENT (OUTPATIENT)
Dept: PULMONOLOGY | Facility: CLINIC | Age: 81
End: 2021-05-20
Payer: MEDICARE

## 2021-05-20 ENCOUNTER — OUTPATIENT (OUTPATIENT)
Dept: OUTPATIENT SERVICES | Facility: HOSPITAL | Age: 81
LOS: 1 days | End: 2021-05-20
Payer: COMMERCIAL

## 2021-05-20 ENCOUNTER — APPOINTMENT (OUTPATIENT)
Dept: RADIOLOGY | Facility: IMAGING CENTER | Age: 81
End: 2021-05-20
Payer: MEDICARE

## 2021-05-20 VITALS
SYSTOLIC BLOOD PRESSURE: 126 MMHG | OXYGEN SATURATION: 98 % | HEIGHT: 68.5 IN | TEMPERATURE: 97.8 F | BODY MASS INDEX: 27.57 KG/M2 | DIASTOLIC BLOOD PRESSURE: 71 MMHG | WEIGHT: 184 LBS | HEART RATE: 70 BPM

## 2021-05-20 DIAGNOSIS — Z98.49 CATARACT EXTRACTION STATUS, UNSPECIFIED EYE: Chronic | ICD-10-CM

## 2021-05-20 DIAGNOSIS — Z98.890 OTHER SPECIFIED POSTPROCEDURAL STATES: Chronic | ICD-10-CM

## 2021-05-20 DIAGNOSIS — R06.00 DYSPNEA, UNSPECIFIED: ICD-10-CM

## 2021-05-20 PROCEDURE — 94010 BREATHING CAPACITY TEST: CPT

## 2021-05-20 PROCEDURE — 99203 OFFICE O/P NEW LOW 30 MIN: CPT | Mod: 25

## 2021-05-20 PROCEDURE — 71046 X-RAY EXAM CHEST 2 VIEWS: CPT

## 2021-05-20 PROCEDURE — 94729 DIFFUSING CAPACITY: CPT

## 2021-05-20 PROCEDURE — 94727 GAS DIL/WSHOT DETER LNG VOL: CPT

## 2021-05-20 PROCEDURE — 71046 X-RAY EXAM CHEST 2 VIEWS: CPT | Mod: 26

## 2021-05-20 RX ORDER — GLIMEPIRIDE 2 MG/1
2 TABLET ORAL
Qty: 90 | Refills: 0 | Status: DISCONTINUED | COMMUNITY
Start: 2020-09-09 | End: 2021-05-20

## 2021-05-20 RX ORDER — SITAGLIPTIN 50 MG/1
50 TABLET, FILM COATED ORAL DAILY
Refills: 0 | Status: DISCONTINUED | COMMUNITY
End: 2021-05-20

## 2021-05-20 NOTE — DISCUSSION/SUMMARY
[FreeTextEntry1] : The patient has clinical features consistent with sleep apnea. Will do a home sleep study.\par Advised to stop drinking alcohol.\par Will do a  chest x-ray.

## 2021-05-20 NOTE — HISTORY OF PRESENT ILLNESS
[Never] : never [TextBox_4] : 80 year old male with h/o dyspnea for 2 weeks. He gets dyspneic after bending  or little heavy activity. There is no cough. There is no chest pain. There is no orthopnea or PND.\par He had a TIA  in Feb and had carotid stent placed in March 2021.\par The patient had Covid in Jan. He was not hospitalized at that time. He had a lot of coughing. He was treated with Azithromycin and Tylenol. He recovered fully.\par He sleeps from 10.30 PM to 6-7 AM. He sleeps well. He sometimes dozes. He snores at night.\par He does not smoke. He drinks 1-2 shots of leti a week.\par He was a building  doing heavy lifting (hydraulic machines). He does not work to earn money. But he does a lot of heavy work for the family.  But he is limited over the last 2 weeks.\par He has H/o CAD and long standing DM.\par The patient had an echocardiogram done in February 21 which showed normal LV systolic function\par

## 2021-05-20 NOTE — PHYSICAL EXAM
[No Acute Distress] : no acute distress [Normal Oropharynx] : normal oropharynx [IV] : Mallampati Class: IV [Normal Appearance] : normal appearance [No Neck Mass] : no neck mass [Normal Rate/Rhythm] : normal rate/rhythm [Normal S1, S2] : normal s1, s2 [No Murmurs] : no murmurs [No Resp Distress] : no resp distress [Clear to Auscultation Bilaterally] : clear to auscultation bilaterally [No Abnormalities] : no abnormalities [Benign] : benign [Normal Gait] : normal gait [No Clubbing] : no clubbing [No Cyanosis] : no cyanosis [No Edema] : no edema [FROM] : FROM [Normal Color/ Pigmentation] : normal color/ pigmentation [No Focal Deficits] : no focal deficits [Oriented x3] : oriented x3 [Normal Affect] : normal affect [Neck Circumference: ___] : neck circumference: [unfilled]

## 2021-05-27 ENCOUNTER — APPOINTMENT (OUTPATIENT)
Dept: PULMONOLOGY | Facility: CLINIC | Age: 81
End: 2021-05-27

## 2021-06-03 ENCOUNTER — NON-APPOINTMENT (OUTPATIENT)
Age: 81
End: 2021-06-03

## 2021-06-10 ENCOUNTER — APPOINTMENT (OUTPATIENT)
Dept: CARDIOLOGY | Facility: CLINIC | Age: 81
End: 2021-06-10
Payer: MEDICARE

## 2021-06-10 ENCOUNTER — NON-APPOINTMENT (OUTPATIENT)
Age: 81
End: 2021-06-10

## 2021-06-10 VITALS
SYSTOLIC BLOOD PRESSURE: 130 MMHG | DIASTOLIC BLOOD PRESSURE: 80 MMHG | OXYGEN SATURATION: 100 % | BODY MASS INDEX: 27.57 KG/M2 | WEIGHT: 184 LBS | HEART RATE: 64 BPM | TEMPERATURE: 97.8 F

## 2021-06-10 PROCEDURE — 99215 OFFICE O/P EST HI 40 MIN: CPT

## 2021-06-10 PROCEDURE — 93000 ELECTROCARDIOGRAM COMPLETE: CPT

## 2021-06-10 NOTE — CARDIOLOGY SUMMARY
[de-identified] : Echocardiogram showed normal left ventricular systolic function without any out any significant valvular abnormalities.  There is mild to moderate aortic insufficiency without aortic stenosis.\par

## 2021-06-10 NOTE — HISTORY OF PRESENT ILLNESS
[FreeTextEntry1] : He is accompanied by his daughter.\par Worsening KUMAR now with minimal exertion. No chest pain or dyspnea at rest.\par No change in meds.\par He missed his stress test appointment last week.\par  \par \par Prior:\par He was seen today for cardiovascular evaluation prior to his planned carotid stenting.\par He has severe left carotid disease diagnosed by both ultrasound and CT scan.\par He is scheduled to undergo carotid stenting under general anesthesia with a direct carotid access approach.\par Patient reports feeling okay overall but, aside from occasional orthostatic episodes with borderline low blood pressure he has been feeling well.\par He reports taking the aspirin and Plavix as directed.\par He is able to walk 3 miles without any chest pains or shortness of breath.\par He has had no anginal symptoms since his stenting in 2009.\par Past medical history is notable for:\par He has been dealing with insulin requiring diabetes mellitus for 15 years.\par He has no history of congestive heart failure, known stroke and quit smoking many years ago.\par He takes medication for BPH and reports some lightheadedness or dizziness when standing up quickly.\par

## 2021-06-10 NOTE — DISCUSSION/SUMMARY
[FreeTextEntry1] : He is a 80-year-old with a history of remote coronary artery disease status post stenting, diabetes mellitus, hypertension, hyperlipidemia and a TIA, now status post left internal carotid stenting.  He presents with prgressive exertional shortness of breath, with no pulmonary source.\par ECG continues to progress and is concerning for significant ischemia.\par Plan for coronary angiography given his story that is most consistent with myocardial ischemia.\par Continue DAPT and high dose statin for now.\par

## 2021-06-14 ENCOUNTER — APPOINTMENT (OUTPATIENT)
Dept: CARDIOLOGY | Facility: CLINIC | Age: 81
End: 2021-06-14

## 2021-06-15 ENCOUNTER — INPATIENT (INPATIENT)
Facility: HOSPITAL | Age: 81
LOS: 0 days | Discharge: HOME | DRG: 247 | End: 2021-06-16
Attending: INTERNAL MEDICINE | Admitting: INTERNAL MEDICINE
Payer: COMMERCIAL

## 2021-06-15 ENCOUNTER — TRANSCRIPTION ENCOUNTER (OUTPATIENT)
Age: 81
End: 2021-06-15

## 2021-06-15 VITALS — WEIGHT: 184.09 LBS | HEIGHT: 68 IN

## 2021-06-15 DIAGNOSIS — Z98.890 OTHER SPECIFIED POSTPROCEDURAL STATES: Chronic | ICD-10-CM

## 2021-06-15 DIAGNOSIS — I25.10 ATHEROSCLEROTIC HEART DISEASE OF NATIVE CORONARY ARTERY WITHOUT ANGINA PECTORIS: ICD-10-CM

## 2021-06-15 DIAGNOSIS — Z95.828 PRESENCE OF OTHER VASCULAR IMPLANTS AND GRAFTS: Chronic | ICD-10-CM

## 2021-06-15 DIAGNOSIS — Z98.49 CATARACT EXTRACTION STATUS, UNSPECIFIED EYE: Chronic | ICD-10-CM

## 2021-06-15 LAB
ANION GAP SERPL CALC-SCNC: 11 MMOL/L — SIGNIFICANT CHANGE UP (ref 5–17)
BASOPHILS # BLD AUTO: 0.03 K/UL — SIGNIFICANT CHANGE UP (ref 0–0.2)
BASOPHILS NFR BLD AUTO: 0.4 % — SIGNIFICANT CHANGE UP (ref 0–2)
BUN SERPL-MCNC: 25 MG/DL — HIGH (ref 7–23)
CALCIUM SERPL-MCNC: 9.8 MG/DL — SIGNIFICANT CHANGE UP (ref 8.4–10.5)
CHLORIDE SERPL-SCNC: 102 MMOL/L — SIGNIFICANT CHANGE UP (ref 96–108)
CO2 SERPL-SCNC: 25 MMOL/L — SIGNIFICANT CHANGE UP (ref 22–31)
CREAT SERPL-MCNC: 1.46 MG/DL — HIGH (ref 0.5–1.3)
EOSINOPHIL # BLD AUTO: 0.17 K/UL — SIGNIFICANT CHANGE UP (ref 0–0.5)
EOSINOPHIL NFR BLD AUTO: 2.5 % — SIGNIFICANT CHANGE UP (ref 0–6)
GLUCOSE BLDC GLUCOMTR-MCNC: 111 MG/DL — HIGH (ref 70–99)
GLUCOSE BLDC GLUCOMTR-MCNC: 141 MG/DL — HIGH (ref 70–99)
GLUCOSE BLDC GLUCOMTR-MCNC: 79 MG/DL — SIGNIFICANT CHANGE UP (ref 70–99)
GLUCOSE BLDC GLUCOMTR-MCNC: 94 MG/DL — SIGNIFICANT CHANGE UP (ref 70–99)
GLUCOSE SERPL-MCNC: 147 MG/DL — HIGH (ref 70–99)
HCT VFR BLD CALC: 42.1 % — SIGNIFICANT CHANGE UP (ref 39–50)
HGB BLD-MCNC: 14.2 G/DL — SIGNIFICANT CHANGE UP (ref 13–17)
IMM GRANULOCYTES NFR BLD AUTO: 0.3 % — SIGNIFICANT CHANGE UP (ref 0–1.5)
LYMPHOCYTES # BLD AUTO: 2.18 K/UL — SIGNIFICANT CHANGE UP (ref 1–3.3)
LYMPHOCYTES # BLD AUTO: 31.7 % — SIGNIFICANT CHANGE UP (ref 13–44)
MCHC RBC-ENTMCNC: 30.8 PG — SIGNIFICANT CHANGE UP (ref 27–34)
MCHC RBC-ENTMCNC: 33.7 GM/DL — SIGNIFICANT CHANGE UP (ref 32–36)
MCV RBC AUTO: 91.3 FL — SIGNIFICANT CHANGE UP (ref 80–100)
MONOCYTES # BLD AUTO: 0.59 K/UL — SIGNIFICANT CHANGE UP (ref 0–0.9)
MONOCYTES NFR BLD AUTO: 8.6 % — SIGNIFICANT CHANGE UP (ref 2–14)
NEUTROPHILS # BLD AUTO: 3.88 K/UL — SIGNIFICANT CHANGE UP (ref 1.8–7.4)
NEUTROPHILS NFR BLD AUTO: 56.5 % — SIGNIFICANT CHANGE UP (ref 43–77)
NRBC # BLD: 0 /100 WBCS — SIGNIFICANT CHANGE UP (ref 0–0)
PLATELET # BLD AUTO: 156 K/UL — SIGNIFICANT CHANGE UP (ref 150–400)
POTASSIUM SERPL-MCNC: 4.9 MMOL/L — SIGNIFICANT CHANGE UP (ref 3.5–5.3)
POTASSIUM SERPL-MCNC: 6.4 MMOL/L — CRITICAL HIGH (ref 3.5–5.3)
POTASSIUM SERPL-SCNC: 4.9 MMOL/L — SIGNIFICANT CHANGE UP (ref 3.5–5.3)
POTASSIUM SERPL-SCNC: 6.4 MMOL/L — CRITICAL HIGH (ref 3.5–5.3)
RBC # BLD: 4.61 M/UL — SIGNIFICANT CHANGE UP (ref 4.2–5.8)
RBC # FLD: 13.2 % — SIGNIFICANT CHANGE UP (ref 10.3–14.5)
SODIUM SERPL-SCNC: 138 MMOL/L — SIGNIFICANT CHANGE UP (ref 135–145)
WBC # BLD: 6.87 K/UL — SIGNIFICANT CHANGE UP (ref 3.8–10.5)
WBC # FLD AUTO: 6.87 K/UL — SIGNIFICANT CHANGE UP (ref 3.8–10.5)

## 2021-06-15 PROCEDURE — 93010 ELECTROCARDIOGRAM REPORT: CPT | Mod: 77

## 2021-06-15 PROCEDURE — 92928 PRQ TCAT PLMT NTRAC ST 1 LES: CPT | Mod: LC

## 2021-06-15 PROCEDURE — 93010 ELECTROCARDIOGRAM REPORT: CPT

## 2021-06-15 PROCEDURE — 92929: CPT | Mod: LC

## 2021-06-15 PROCEDURE — 93458 L HRT ARTERY/VENTRICLE ANGIO: CPT | Mod: 26,59

## 2021-06-15 PROCEDURE — 99152 MOD SED SAME PHYS/QHP 5/>YRS: CPT

## 2021-06-15 RX ORDER — INSULIN GLARGINE 100 [IU]/ML
20 INJECTION, SOLUTION SUBCUTANEOUS AT BEDTIME
Refills: 0 | Status: DISCONTINUED | OUTPATIENT
Start: 2021-06-15 | End: 2021-06-16

## 2021-06-15 RX ORDER — GLUCAGON INJECTION, SOLUTION 0.5 MG/.1ML
1 INJECTION, SOLUTION SUBCUTANEOUS ONCE
Refills: 0 | Status: DISCONTINUED | OUTPATIENT
Start: 2021-06-15 | End: 2021-06-16

## 2021-06-15 RX ORDER — ASPIRIN/CALCIUM CARB/MAGNESIUM 324 MG
81 TABLET ORAL DAILY
Refills: 0 | Status: DISCONTINUED | OUTPATIENT
Start: 2021-06-15 | End: 2021-06-16

## 2021-06-15 RX ORDER — CLOPIDOGREL BISULFATE 75 MG/1
75 TABLET, FILM COATED ORAL DAILY
Refills: 0 | Status: DISCONTINUED | OUTPATIENT
Start: 2021-06-16 | End: 2021-06-16

## 2021-06-15 RX ORDER — INSULIN LISPRO 100/ML
VIAL (ML) SUBCUTANEOUS
Refills: 0 | Status: DISCONTINUED | OUTPATIENT
Start: 2021-06-15 | End: 2021-06-16

## 2021-06-15 RX ORDER — ATORVASTATIN CALCIUM 80 MG/1
40 TABLET, FILM COATED ORAL AT BEDTIME
Refills: 0 | Status: DISCONTINUED | OUTPATIENT
Start: 2021-06-15 | End: 2021-06-16

## 2021-06-15 RX ORDER — SODIUM CHLORIDE 9 MG/ML
1000 INJECTION, SOLUTION INTRAVENOUS
Refills: 0 | Status: DISCONTINUED | OUTPATIENT
Start: 2021-06-15 | End: 2021-06-16

## 2021-06-15 RX ORDER — SITAGLIPTIN 50 MG/1
1 TABLET, FILM COATED ORAL
Qty: 0 | Refills: 0 | DISCHARGE

## 2021-06-15 RX ORDER — DEXTROSE 50 % IN WATER 50 %
25 SYRINGE (ML) INTRAVENOUS ONCE
Refills: 0 | Status: DISCONTINUED | OUTPATIENT
Start: 2021-06-15 | End: 2021-06-16

## 2021-06-15 RX ORDER — INSULIN LISPRO 100/ML
VIAL (ML) SUBCUTANEOUS AT BEDTIME
Refills: 0 | Status: DISCONTINUED | OUTPATIENT
Start: 2021-06-15 | End: 2021-06-16

## 2021-06-15 RX ORDER — DEXTROSE 50 % IN WATER 50 %
15 SYRINGE (ML) INTRAVENOUS ONCE
Refills: 0 | Status: DISCONTINUED | OUTPATIENT
Start: 2021-06-15 | End: 2021-06-16

## 2021-06-15 RX ORDER — GLIMEPIRIDE 1 MG
1 TABLET ORAL
Qty: 0 | Refills: 0 | DISCHARGE

## 2021-06-15 RX ORDER — TAMSULOSIN HYDROCHLORIDE 0.4 MG/1
0.4 CAPSULE ORAL AT BEDTIME
Refills: 0 | Status: DISCONTINUED | OUTPATIENT
Start: 2021-06-15 | End: 2021-06-16

## 2021-06-15 RX ORDER — VALSARTAN 80 MG/1
80 TABLET ORAL DAILY
Refills: 0 | Status: DISCONTINUED | OUTPATIENT
Start: 2021-06-15 | End: 2021-06-16

## 2021-06-15 RX ORDER — DEXTROSE 50 % IN WATER 50 %
12.5 SYRINGE (ML) INTRAVENOUS ONCE
Refills: 0 | Status: DISCONTINUED | OUTPATIENT
Start: 2021-06-15 | End: 2021-06-16

## 2021-06-15 RX ORDER — SODIUM CHLORIDE 9 MG/ML
1000 INJECTION INTRAMUSCULAR; INTRAVENOUS; SUBCUTANEOUS
Refills: 0 | Status: DISCONTINUED | OUTPATIENT
Start: 2021-06-15 | End: 2021-06-16

## 2021-06-15 RX ORDER — DAPAGLIFLOZIN 10 MG/1
1 TABLET, FILM COATED ORAL
Qty: 0 | Refills: 0 | DISCHARGE

## 2021-06-15 RX ADMIN — VALSARTAN 80 MILLIGRAM(S): 80 TABLET ORAL at 21:47

## 2021-06-15 RX ADMIN — INSULIN GLARGINE 20 UNIT(S): 100 INJECTION, SOLUTION SUBCUTANEOUS at 21:45

## 2021-06-15 RX ADMIN — SODIUM CHLORIDE 75 MILLILITER(S): 9 INJECTION INTRAMUSCULAR; INTRAVENOUS; SUBCUTANEOUS at 13:12

## 2021-06-15 RX ADMIN — ATORVASTATIN CALCIUM 40 MILLIGRAM(S): 80 TABLET, FILM COATED ORAL at 21:45

## 2021-06-15 RX ADMIN — TAMSULOSIN HYDROCHLORIDE 0.4 MILLIGRAM(S): 0.4 CAPSULE ORAL at 21:46

## 2021-06-15 NOTE — H&P CARDIOLOGY - HISTORY OF PRESENT ILLNESS
80 yo M with PMhx DM, HTN, CAD native artery, carotid artery stent ,  BPH, KUMAR, gross hematuria, SATNAM , TIA, c/o progressive SOB  presents for [facility] . Patient states he use to walk 3 miles a day without any symptoms. However he states since he had COVID in January he has been experiencing KUMAR with minimal exertion .  He was found to have EKG changes so Dr. Lisker referred him for cath. No palpitations , CP or dizziness. No fever, chills, N/V/D or abdominal pain . No PPM or implantable cardiac device. He received his vaccination x 2 Pfizer last dose 4/27/21.

## 2021-06-15 NOTE — H&P CARDIOLOGY - PSH
H/O arthroscopy  right knee  H/O cardiac catheterization  2 coronary stents  Presence of internal carotid stent    S/P cataract surgery

## 2021-06-15 NOTE — CHART NOTE - NSCHARTNOTEFT_GEN_A_CORE
Removal of Femoral Sheath    Pulses in the (right) lower extremity are (palpable). The patient was placed in the supine position. The insertion site was identified and the sutures were removed per protocol.  The __6 [place] femoral sheath was then removed. Direct pressure was applied for  ___20___ minutes.     Monitoring of the (right) groin and both lower extremities including neuro-vascular checks and vital signs every 15 minutes x 4, then every 30 minutes x 2, then every 1 hour was ordered.    Complications: None    Comments:

## 2021-06-16 ENCOUNTER — TRANSCRIPTION ENCOUNTER (OUTPATIENT)
Age: 81
End: 2021-06-16

## 2021-06-16 VITALS
SYSTOLIC BLOOD PRESSURE: 123 MMHG | RESPIRATION RATE: 18 BRPM | DIASTOLIC BLOOD PRESSURE: 77 MMHG | TEMPERATURE: 99 F | HEART RATE: 51 BPM | OXYGEN SATURATION: 95 %

## 2021-06-16 PROCEDURE — C1769: CPT

## 2021-06-16 PROCEDURE — 99238 HOSP IP/OBS DSCHRG MGMT 30/<: CPT

## 2021-06-16 PROCEDURE — C9600: CPT | Mod: LC

## 2021-06-16 PROCEDURE — C9601: CPT | Mod: LC

## 2021-06-16 PROCEDURE — 99152 MOD SED SAME PHYS/QHP 5/>YRS: CPT

## 2021-06-16 PROCEDURE — 82962 GLUCOSE BLOOD TEST: CPT

## 2021-06-16 PROCEDURE — 84132 ASSAY OF SERUM POTASSIUM: CPT

## 2021-06-16 PROCEDURE — 99153 MOD SED SAME PHYS/QHP EA: CPT

## 2021-06-16 PROCEDURE — C1894: CPT

## 2021-06-16 PROCEDURE — 93005 ELECTROCARDIOGRAM TRACING: CPT

## 2021-06-16 PROCEDURE — C1725: CPT

## 2021-06-16 PROCEDURE — 80048 BASIC METABOLIC PNL TOTAL CA: CPT

## 2021-06-16 PROCEDURE — C1874: CPT

## 2021-06-16 PROCEDURE — 93458 L HRT ARTERY/VENTRICLE ANGIO: CPT | Mod: 59

## 2021-06-16 PROCEDURE — C1887: CPT

## 2021-06-16 PROCEDURE — 85025 COMPLETE CBC W/AUTO DIFF WBC: CPT

## 2021-06-16 RX ADMIN — Medication 81 MILLIGRAM(S): at 05:06

## 2021-06-16 RX ADMIN — CLOPIDOGREL BISULFATE 75 MILLIGRAM(S): 75 TABLET, FILM COATED ORAL at 05:06

## 2021-06-16 NOTE — DISCHARGE NOTE PROVIDER - NSDCMRMEDTOKEN_GEN_ALL_CORE_FT
aspirin 81 mg oral tablet: 1 tab(s) orally once a day  atorvastatin 40 mg oral tablet: 1 tab(s) orally once a day (at bedtime)  clopidogrel 75 mg oral tablet: 1 tab(s) orally once a day  Farxiga 10 mg oral tablet: 1 tab(s) orally once a day  Lantus Solostar Pen 100 units/mL subcutaneous solution: 25 unit(s) subcutaneous once a day (at bedtime)  tamsulosin 0.4 mg oral capsule: 1 cap(s) orally once a day  valsartan 80 mg oral tablet: 1 tab(s) orally once a day (at bedtime)

## 2021-06-16 NOTE — DISCHARGE NOTE PROVIDER - CARE PROVIDER_API CALL
Lisker, Jay J (MD)  Cardiovascular Disease; Internal Medicine  [address]  [place] [address]  Phone: [phone]  Fax: [phone]  Follow Up Time: 2 weeks

## 2021-06-16 NOTE — DISCHARGE NOTE PROVIDER - NSDCCPCAREPLAN_GEN_ALL_CORE_FT
PRINCIPAL DISCHARGE DIAGNOSIS  Diagnosis: CAD (coronary artery disease)  Assessment and Plan of Treatment: Do not stop your aspirin or Plavix unless instructed to do so by your cardiologist, they help keep your stented arteries open.   No heavy lifting, strenuous activity, bending, straining, or unnecessary stair climbing for 2 weeks. No driving for 2 days. You may shower 24 hours following the procedure but avoid baths/swimming for 1 week. Check your groin site for bleeding and/or swelling daily following procedure and call your doctor immediately if it occurs or if you experience increased pain at the site. Follow up with your cardiologist in 1-2 weeks. You may call [facility] if you have any questions/concerns regarding your procedure [phone].      SECONDARY DISCHARGE DIAGNOSES  Diagnosis: HTN (hypertension)  Assessment and Plan of Treatment: Continue with your blood pressure medications; eat a heart healthy diet with low salt diet; exercise regularly (consult with your physician or cardiologist first); maintain a heart healthy weight; if you smoke - quit (A resource to help you stop smoking is the [facility] [employer] Control – phone number [phone].); include healthy ways to manage stress. Continue to follow with your primary care physician or cardiologist.    Diagnosis: HLD (hyperlipidemia)  Assessment and Plan of Treatment: Continue with your cholesterol medications. Eat a heart healthy diet that is low in saturated fats and salt, and includes whole grains, fruits, vegetables and lean protein; exercise regularly (consult with your physician or cardiologist first); maintain a heart healthy weight; if you smoke - quit (A resource to help you stop smoking is the [facility] [employer] for [employer] Control – phone number [phone].). Continue to follow with your primary physician or cardiologist.    Diagnosis: DM type 2, goal HbA1c < 7%  Assessment and Plan of Treatment: Your Hemoglobin A1c level is   Continue to follow with your primary care MD or your endocrinologist.  Follow a heart healthy diabetic diet. If you check your fingerstick glucose at home, call your MD if it is greater than 250mg/dL on 2 occasions or less than 100mg/dL on 2 occasions. Know signs of low blood sugar, such as: dizziness, shakiness, sweating, confusion, hunger, nervousness-drink 4 ounces apple juice if occurs and call your doctor. Know early signs of high blood sugar, such as: frequent urination, increased thirst, blurry vision, fatigue, headache - call your doctor if this occurs. Follow with other practitioners to care for your diabetes, such as ophthalmologist and podiatrist.

## 2021-06-16 NOTE — DISCHARGE NOTE PROVIDER - HOSPITAL COURSE
HPI:  82 yo M with PMhx DM, HTN, CAD native artery, carotid artery stent ,  BPH, KUMAR, gross hematuria, SATNAM, TIA, c/o progressive SOB  presents for [facility]. Patient states he use to walk 3 miles a day without any symptoms. However he states since he had COVID in January he has been experiencing KUMAR with minimal exertion .  He was found to have EKG changes so Dr. Lisker referred him for cath. No palpitations , CP or dizziness. No fever, chills, N/V/D or abdominal pain . No PPM or implantable cardiac device. He received his vaccination x 2 Pfizer last dose 4/27/21. (15 Hayder 2021 08:29).    6/15 cardiac cath with stents to the OM1 and OM2. Right femoral and right radial sites without swelling, bleeding.

## 2021-06-16 NOTE — DISCHARGE NOTE PROVIDER - NSDCPNSUBOBJ_GEN_ALL_CORE
Patient is a 81y old  Male who presents with a chief complaint of KUMAR, EKG changes        Allergies    No Known Allergies    Intolerances        Medications:  aspirin enteric coated 81 milliGRAM(s) Oral daily  atorvastatin 40 milliGRAM(s) Oral at bedtime  clopidogrel Tablet 75 milliGRAM(s) Oral daily  dextrose 40% Gel 15 Gram(s) Oral once  dextrose 5%. 1000 milliLiter(s) IV Continuous <Continuous>  dextrose 5%. 1000 milliLiter(s) IV Continuous <Continuous>  dextrose 50% Injectable 25 Gram(s) IV Push once  dextrose 50% Injectable 12.5 Gram(s) IV Push once  dextrose 50% Injectable 25 Gram(s) IV Push once  glucagon  Injectable 1 milliGRAM(s) IntraMuscular once  insulin glargine Injectable (LANTUS) 20 Unit(s) SubCutaneous at bedtime  insulin lispro (ADMELOG) corrective regimen sliding scale   SubCutaneous three times a day before meals  insulin lispro (ADMELOG) corrective regimen sliding scale   SubCutaneous at bedtime  sodium chloride 0.9%. 1000 milliLiter(s) IV Continuous <Continuous>  tamsulosin 0.4 milliGRAM(s) Oral at bedtime  valsartan 80 milliGRAM(s) Oral daily      Vitals:  T(C): 36.7 (06-15-21 @ 12:55), Max: 36.7 (06-15-21 @ 08:36)  HR: 55 (06-15-21 @ 21:42) (44 - 61)  BP: 141/68 (06-15-21 @ 21:42) (99/62 - 167/71)  BP(mean): 78 (06-15-21 @ 19:00) (78 - 95)  RR: 18 (06-15-21 @ 20:00) (12 - 18)  SpO2: 97% (06-15-21 @ 20:00) (97% - 100%)  Wt(kg): --  Daily Height in cm: 172.72 (15 Hayder 2021 08:36)    Daily Weight in k.5 (15 Hayder 2021 08:29)  I&O's Summary    15 Hayder 2021 07:01  -  2021 03:21  --------------------------------------------------------  IN: 375 mL / OUT: 0 mL / NET: 375 mL          Physical Exam:  Appearance: Normal  Eyes: PERRL, EOMI  HENT: Normal oral muscosa, NC/AT  Cardiovascular: S1S2, RRR, No M/R/G, no JVD, No Lower extremity edema  Procedural Access Site: No hematoma, Non-tender to palpation, 2+ pulse, No bruit, No Ecchymosis  Respiratory: Clear to auscultation bilaterally  Gastrointestinal: Soft, Non tender, Normal Bowel Sounds  Musculoskeletal: No clubbing, No joint deformity   Neurologic: Non-focal  Lymphatic: No lymphadenopathy  Psychiatry: AAOx3, Mood & affect appropriate  Skin: No rashes, No ecchymoses, No cyanosis    06-15    x   |  x   |  x   ----------------------------<  x   4.9   |  x   |  x     Ca    9.8      15 Hayder 2021 08:56              Lipid panel   Hgb A1c                         14.2   6.87  )-----------( 156      ( 15 Hayder 2021 08:56 )             42.1         ECG: SB 52 bpm    Cath: stents to the OM1 and OM2    HTN: Continue antihypertensive medications with hold parameters     HLD: continue statin, confirm lipid panel results     Interpretation of Telemetry:

## 2021-06-16 NOTE — DISCHARGE NOTE NURSING/CASE MANAGEMENT/SOCIAL WORK - PATIENT PORTAL LINK FT
You can access the FollowMyHealth Patient Portal offered by [facility] by registering at the following website: [email or web address]. By joining [employer]’s FollowMyHealth portal, you will also be able to view your health information using other applications (apps) compatible with our system.

## 2021-06-16 NOTE — DISCHARGE NOTE PROVIDER - NSDCCPTREATMENT_GEN_ALL_CORE_FT
PRINCIPAL PROCEDURE  Procedure: Placement of coronary artery stent  Findings and Treatment: stents to the OM1 and OM2

## 2021-06-21 ENCOUNTER — APPOINTMENT (OUTPATIENT)
Dept: CARDIOLOGY | Facility: CLINIC | Age: 81
End: 2021-06-21

## 2021-06-21 NOTE — PROVIDER CONTACT NOTE (OTHER) - REASON
----- Message from Ania Dailey MD sent at 6/18/2021  3:56 PM CDT -----  Please notify the patient of normal results.   Follow-up as recommended at the last visit
LVM Informing patient.
Pt refused to be straight cath
patient BP is low 86/46

## 2021-06-29 ENCOUNTER — NON-APPOINTMENT (OUTPATIENT)
Age: 81
End: 2021-06-29

## 2021-06-29 ENCOUNTER — APPOINTMENT (OUTPATIENT)
Dept: PULMONOLOGY | Facility: CLINIC | Age: 81
End: 2021-06-29
Payer: MEDICARE

## 2021-06-29 ENCOUNTER — APPOINTMENT (OUTPATIENT)
Dept: CARDIOLOGY | Facility: CLINIC | Age: 81
End: 2021-06-29
Payer: MEDICARE

## 2021-06-29 VITALS — DIASTOLIC BLOOD PRESSURE: 50 MMHG | SYSTOLIC BLOOD PRESSURE: 80 MMHG

## 2021-06-29 VITALS
HEIGHT: 68.5 IN | BODY MASS INDEX: 26.97 KG/M2 | TEMPERATURE: 97.1 F | SYSTOLIC BLOOD PRESSURE: 104 MMHG | HEART RATE: 61 BPM | DIASTOLIC BLOOD PRESSURE: 60 MMHG | WEIGHT: 180 LBS | OXYGEN SATURATION: 95 %

## 2021-06-29 VITALS
SYSTOLIC BLOOD PRESSURE: 106 MMHG | BODY MASS INDEX: 28.17 KG/M2 | OXYGEN SATURATION: 98 % | HEART RATE: 59 BPM | WEIGHT: 188 LBS | DIASTOLIC BLOOD PRESSURE: 62 MMHG

## 2021-06-29 VITALS — OXYGEN SATURATION: 97 % | HEART RATE: 51 BPM

## 2021-06-29 DIAGNOSIS — G47.33 OBSTRUCTIVE SLEEP APNEA (ADULT) (PEDIATRIC): ICD-10-CM

## 2021-06-29 DIAGNOSIS — R06.00 DYSPNEA, UNSPECIFIED: ICD-10-CM

## 2021-06-29 PROCEDURE — 99214 OFFICE O/P EST MOD 30 MIN: CPT

## 2021-06-29 PROCEDURE — 93000 ELECTROCARDIOGRAM COMPLETE: CPT

## 2021-06-29 RX ORDER — VALSARTAN 80 MG/1
80 TABLET, COATED ORAL
Qty: 30 | Refills: 0 | Status: DISCONTINUED | COMMUNITY
Start: 2020-12-07

## 2021-06-29 RX ORDER — SITAGLIPTIN 100 MG/1
100 TABLET, FILM COATED ORAL
Qty: 90 | Refills: 0 | Status: DISCONTINUED | COMMUNITY
Start: 2021-03-10

## 2021-06-29 RX ORDER — VALSARTAN 40 MG/1
40 TABLET, COATED ORAL
Qty: 90 | Refills: 2 | Status: DISCONTINUED | COMMUNITY
Start: 1900-01-01 | End: 2021-06-29

## 2021-06-29 RX ORDER — AZITHROMYCIN 250 MG/1
250 TABLET, FILM COATED ORAL
Qty: 6 | Refills: 0 | Status: DISCONTINUED | COMMUNITY
Start: 2021-01-15

## 2021-06-29 RX ORDER — PEN NEEDLE, DIABETIC 32GX 5/32"
32G X 4 MM NEEDLE, DISPOSABLE MISCELLANEOUS
Qty: 100 | Refills: 0 | Status: DISCONTINUED | COMMUNITY
Start: 2021-04-20

## 2021-06-29 RX ORDER — INSULIN GLARGINE 100 [IU]/ML
100 INJECTION, SOLUTION SUBCUTANEOUS
Qty: 21 | Refills: 0 | Status: ACTIVE | COMMUNITY
Start: 2021-06-02

## 2021-06-29 RX ORDER — CLOTRIMAZOLE AND BETAMETHASONE DIPROPIONATE 10; .5 MG/G; MG/G
1-0.05 CREAM TOPICAL
Qty: 15 | Refills: 0 | Status: DISCONTINUED | COMMUNITY
Start: 2021-05-24

## 2021-06-29 NOTE — CARDIOLOGY SUMMARY
[de-identified] : Echocardiogram showed normal left ventricular systolic function without any out any significant valvular abnormalities.  There is mild to moderate aortic insufficiency without aortic stenosis.\par

## 2021-06-29 NOTE — DISCUSSION/SUMMARY
[FreeTextEntry1] : He is a 80-year-old with a history of remote coronary artery disease status post stenting, diabetes mellitus, hypertension, hyperlipidemia and a TIA, now status post left internal carotid stenting.  \par Now s/p LCx stenting with diffuse residual CAD.\par He is orthostatic today.\par I will discontinue his Diovan.\par Consider flomax in PM only.\par Continue farxiga pending discussion with his PMD.\par Plan for cardiac rehab once he is no longer orthostatic.\par Encouraged hydration for now.\par

## 2021-06-29 NOTE — HISTORY OF PRESENT ILLNESS
[Never] : never [TextBox_4] : The patient is here for a follow up of dyspnea. He had a cardiac cath and had 2 stents placed 2 weeks back. \par He has orthostatic hypotension. His cardiologist stopped his antihypertensives for now.\par His PFT showed restrictive impairment. CXR was normal.\par Home sleep study reveals severe sleep apnea.

## 2021-06-29 NOTE — HISTORY OF PRESENT ILLNESS
[FreeTextEntry1] : He is accompanied by his daughter.\par s/p coronaory stents to the OM 1 and 2.\par Still fatigued. Some lightheadedness and dizziness. NO falls.\par Taking Farxiga and flowmax.\par \par \par He reports that he has been having shortness of breath when he is taking in his garden.  There is no associated chest pain.  He has no orthopnea, PND but does note leg edema at the end of the day.  He was told to drink lots of water because of his renal insufficiency.\par He is taking his medications as directed.\par \par Prior:\par He was seen today for cardiovascular evaluation prior to his planned carotid stenting.\par He has severe left carotid disease diagnosed by both ultrasound and CT scan.\par He is scheduled to undergo carotid stenting under general anesthesia with a direct carotid access approach.\par Patient reports feeling okay overall but, aside from occasional orthostatic episodes with borderline low blood pressure he has been feeling well.\par He reports taking the aspirin and Plavix as directed.\par He is able to walk 3 miles without any chest pains or shortness of breath.\par He has had no anginal symptoms since his stenting in 2009.\par Past medical history is notable for:\par He has been dealing with insulin requiring diabetes mellitus for 15 years.\par He has no history of congestive heart failure, known stroke and quit smoking many years ago.\par He takes medication for BPH and reports some lightheadedness or dizziness when standing up quickly.\par

## 2021-06-29 NOTE — DISCUSSION/SUMMARY
[FreeTextEntry1] : The patient was offered PAP therapy. He does not want it at present.\par Will revisit after his BP is stable and he completes cardiac rehab.

## 2021-08-23 ENCOUNTER — APPOINTMENT (OUTPATIENT)
Dept: CARDIOLOGY | Facility: CLINIC | Age: 81
End: 2021-08-23
Payer: MEDICARE

## 2021-08-23 ENCOUNTER — NON-APPOINTMENT (OUTPATIENT)
Age: 81
End: 2021-08-23

## 2021-08-23 VITALS — SYSTOLIC BLOOD PRESSURE: 136 MMHG | DIASTOLIC BLOOD PRESSURE: 84 MMHG

## 2021-08-23 VITALS
HEART RATE: 72 BPM | RESPIRATION RATE: 17 BRPM | HEIGHT: 60 IN | SYSTOLIC BLOOD PRESSURE: 126 MMHG | DIASTOLIC BLOOD PRESSURE: 88 MMHG | WEIGHT: 187 LBS | BODY MASS INDEX: 36.71 KG/M2 | OXYGEN SATURATION: 97 %

## 2021-08-23 VITALS — SYSTOLIC BLOOD PRESSURE: 104 MMHG | DIASTOLIC BLOOD PRESSURE: 68 MMHG

## 2021-08-23 DIAGNOSIS — R55 SYNCOPE AND COLLAPSE: ICD-10-CM

## 2021-08-23 PROCEDURE — 93000 ELECTROCARDIOGRAM COMPLETE: CPT

## 2021-08-23 PROCEDURE — 99214 OFFICE O/P EST MOD 30 MIN: CPT

## 2021-08-23 NOTE — HISTORY OF PRESENT ILLNESS
[FreeTextEntry1] : Felt fine.\par Did have some stressful days with headaches. BP was high (190/90's at home) took valsartan for 2 days and none since. Did feel dizzy after the second day.\par No dizziness when he stands up.\par \par \par Prior:\par He is accompanied by his daughter.\par s/p coronaory stents to the OM 1 and 2.\par Still fatigued. Some lightheadedness and dizziness. NO falls.\par Taking Farxiga and flowmax.\par \par \par He reports that he has been having shortness of breath when he is taking in his garden.  There is no associated chest pain.  He has no orthopnea, PND but does note leg edema at the end of the day.  He was told to drink lots of water because of his renal insufficiency.\par He is taking his medications as directed.\par \par Prior:\par He was seen today for cardiovascular evaluation prior to his planned carotid stenting.\par He has severe left carotid disease diagnosed by both ultrasound and CT scan.\par He is scheduled to undergo carotid stenting under general anesthesia with a direct carotid access approach.\par Patient reports feeling okay overall but, aside from occasional orthostatic episodes with borderline low blood pressure he has been feeling well.\par He reports taking the aspirin and Plavix as directed.\par He is able to walk 3 miles without any chest pains or shortness of breath.\par He has had no anginal symptoms since his stenting in 2009.\par Past medical history is notable for:\par He has been dealing with insulin requiring diabetes mellitus for 15 years.\par He has no history of congestive heart failure, known stroke and quit smoking many years ago.\par He takes medication for BPH and reports some lightheadedness or dizziness when standing up quickly.\par

## 2021-08-23 NOTE — CARDIOLOGY SUMMARY
[de-identified] : Echocardiogram showed normal left ventricular systolic function without any out any significant valvular abnormalities.  There is mild to moderate aortic insufficiency without aortic stenosis.\par

## 2021-08-23 NOTE — DISCUSSION/SUMMARY
[FreeTextEntry1] : He is a 81-year-old with a history of remote coronary artery disease status post stenting, diabetes mellitus, hypertension, hyperlipidemia and a TIA, now status post left internal carotid stenting.  \par Now s/p LCx stenting with diffuse residual CAD.\par He is still somewhat orthostatic but no symptoms. Continue off of Diovan.\par Consider Flomax in PM only. \par Continue farxiga for now, but may be the source of his orthostasis.\par Labs to r/o metabolic abnormalities that cause orthostasis.\par 
need for outpatient follow-up/return to ED if symptoms worsen, persist or questions arise

## 2021-08-24 LAB
ALBUMIN SERPL ELPH-MCNC: 4 G/DL
ALP BLD-CCNC: 67 U/L
ALT SERPL-CCNC: 17 U/L
ANION GAP SERPL CALC-SCNC: 11 MMOL/L
AST SERPL-CCNC: 23 U/L
BASOPHILS # BLD AUTO: 0.02 K/UL
BASOPHILS NFR BLD AUTO: 0.3 %
BILIRUB SERPL-MCNC: 0.5 MG/DL
BUN SERPL-MCNC: 22 MG/DL
CALCIUM SERPL-MCNC: 9.2 MG/DL
CHLORIDE SERPL-SCNC: 102 MMOL/L
CHOLEST SERPL-MCNC: 139 MG/DL
CO2 SERPL-SCNC: 23 MMOL/L
CORTIS SERPL-MCNC: 6.5 UG/DL
CREAT SERPL-MCNC: 1.23 MG/DL
EOSINOPHIL # BLD AUTO: 0.13 K/UL
EOSINOPHIL NFR BLD AUTO: 2.1 %
ESTIMATED AVERAGE GLUCOSE: 183 MG/DL
GLUCOSE SERPL-MCNC: 285 MG/DL
HBA1C MFR BLD HPLC: 8 %
HCT VFR BLD CALC: 43.4 %
HDLC SERPL-MCNC: 59 MG/DL
HGB BLD-MCNC: 14.3 G/DL
IMM GRANULOCYTES NFR BLD AUTO: 0.2 %
LDLC SERPL CALC-MCNC: 56 MG/DL
LYMPHOCYTES # BLD AUTO: 1.67 K/UL
LYMPHOCYTES NFR BLD AUTO: 26.8 %
MAGNESIUM SERPL-MCNC: 2.1 MG/DL
MAN DIFF?: NORMAL
MCHC RBC-ENTMCNC: 29.4 PG
MCHC RBC-ENTMCNC: 32.9 GM/DL
MCV RBC AUTO: 89.3 FL
MONOCYTES # BLD AUTO: 0.41 K/UL
MONOCYTES NFR BLD AUTO: 6.6 %
NEUTROPHILS # BLD AUTO: 3.98 K/UL
NEUTROPHILS NFR BLD AUTO: 64 %
NONHDLC SERPL-MCNC: 80 MG/DL
PLATELET # BLD AUTO: 145 K/UL
POTASSIUM SERPL-SCNC: 5.1 MMOL/L
PROT SERPL-MCNC: 6.8 G/DL
RBC # BLD: 4.86 M/UL
RBC # FLD: 12.9 %
SODIUM SERPL-SCNC: 136 MMOL/L
TRIGL SERPL-MCNC: 120 MG/DL
TSH SERPL-ACNC: 1.97 UIU/ML
WBC # FLD AUTO: 6.22 K/UL

## 2021-09-13 ENCOUNTER — APPOINTMENT (OUTPATIENT)
Dept: CARDIOLOGY | Facility: CLINIC | Age: 81
End: 2021-09-13

## 2021-09-24 ENCOUNTER — APPOINTMENT (OUTPATIENT)
Dept: UROLOGY | Facility: CLINIC | Age: 81
End: 2021-09-24
Payer: MEDICARE

## 2021-09-24 VITALS
HEIGHT: 60 IN | BODY MASS INDEX: 36.71 KG/M2 | HEART RATE: 57 BPM | WEIGHT: 187 LBS | SYSTOLIC BLOOD PRESSURE: 121 MMHG | DIASTOLIC BLOOD PRESSURE: 77 MMHG | RESPIRATION RATE: 17 BRPM

## 2021-09-24 DIAGNOSIS — N40.0 BENIGN PROSTATIC HYPERPLASIA WITHOUT LOWER URINARY TRACT SYMPMS: ICD-10-CM

## 2021-09-24 PROCEDURE — 99213 OFFICE O/P EST LOW 20 MIN: CPT

## 2021-09-24 RX ORDER — DAPAGLIFLOZIN 10 MG/1
10 TABLET, FILM COATED ORAL
Qty: 90 | Refills: 0 | Status: COMPLETED | COMMUNITY
Start: 2021-05-24 | End: 2021-09-24

## 2021-09-24 RX ORDER — ATORVASTATIN CALCIUM 20 MG/1
20 TABLET, FILM COATED ORAL
Qty: 90 | Refills: 0 | Status: DISCONTINUED | COMMUNITY
Start: 2021-09-14

## 2021-10-11 ENCOUNTER — APPOINTMENT (OUTPATIENT)
Dept: CARDIOLOGY | Facility: CLINIC | Age: 81
End: 2021-10-11

## 2021-10-13 ENCOUNTER — NON-APPOINTMENT (OUTPATIENT)
Age: 81
End: 2021-10-13

## 2021-10-13 ENCOUNTER — APPOINTMENT (OUTPATIENT)
Dept: CARDIOLOGY | Facility: CLINIC | Age: 81
End: 2021-10-13
Payer: MEDICARE

## 2021-10-13 VITALS — DIASTOLIC BLOOD PRESSURE: 60 MMHG | SYSTOLIC BLOOD PRESSURE: 136 MMHG

## 2021-10-13 VITALS
HEART RATE: 58 BPM | SYSTOLIC BLOOD PRESSURE: 140 MMHG | OXYGEN SATURATION: 97 % | WEIGHT: 193 LBS | BODY MASS INDEX: 28.58 KG/M2 | DIASTOLIC BLOOD PRESSURE: 70 MMHG | HEIGHT: 69 IN

## 2021-10-13 VITALS — SYSTOLIC BLOOD PRESSURE: 110 MMHG | DIASTOLIC BLOOD PRESSURE: 60 MMHG

## 2021-10-13 DIAGNOSIS — I95.1 ORTHOSTATIC HYPOTENSION: ICD-10-CM

## 2021-10-13 PROCEDURE — 93000 ELECTROCARDIOGRAM COMPLETE: CPT

## 2021-10-13 PROCEDURE — 99214 OFFICE O/P EST MOD 30 MIN: CPT

## 2021-10-13 RX ORDER — FINASTERIDE 5 MG/1
5 TABLET, FILM COATED ORAL
Qty: 90 | Refills: 3 | Status: ACTIVE | COMMUNITY
Start: 2021-10-13 | End: 1900-01-01

## 2021-10-13 NOTE — CARDIOLOGY SUMMARY
[de-identified] : Echocardiogram showed normal left ventricular systolic function without any out any significant valvular abnormalities.  There is mild to moderate aortic insufficiency without aortic stenosis.\par

## 2021-10-13 NOTE — PHYSICAL EXAM
[General Appearance - Well Developed] : well developed [General Appearance - Well Nourished] : well nourished [General Appearance - In No Acute Distress] : no acute distress [Normal Conjunctiva] : the conjunctiva exhibited no abnormalities [Normal Jugular Venous V Waves Present] : normal jugular venous V waves present [Respiration, Rhythm And Depth] : normal respiratory rhythm and effort [Auscultation Breath Sounds / Voice Sounds] : lungs were clear to auscultation bilaterally [Heart Sounds] : normal S1 and S2 [Arterial Pulses Normal] : the arterial pulses were normal [Edema] : no peripheral edema present [Regular] : the rhythm was regular [Bowel Sounds] : normal bowel sounds [Abdomen Soft] : soft [Abnormal Walk] : normal gait [Cyanosis, Localized] : no localized cyanosis [Skin Turgor] : normal skin turgor [Affect] : the affect was normal [Oriented To Time, Place, And Person] : oriented to person, place, and time

## 2021-10-13 NOTE — HISTORY OF PRESENT ILLNESS
[FreeTextEntry1] : Stopped Farxiga. \par No further dizziness.\par No chest pain or dyspnea.\par No lab abnormalities.\par \par Prior:\par Felt fine.\par Did have some stressful days with headaches. BP was high (190/90's at home) took valsartan for 2 days and none since. Did feel dizzy after the second day.\par No dizziness when he stands up.\par \par \par Prior:\par He is accompanied by his daughter.\par s/p coronaory stents to the OM 1 and 2.\par Still fatigued. Some lightheadedness and dizziness. NO falls.\par Taking Farxiga and flowmax.\par \par \par He reports that he has been having shortness of breath when he is taking in his garden.  There is no associated chest pain.  He has no orthopnea, PND but does note leg edema at the end of the day.  He was told to drink lots of water because of his renal insufficiency.\par He is taking his medications as directed.\par \par Prior:\par He was seen today for cardiovascular evaluation prior to his planned carotid stenting.\par He has severe left carotid disease diagnosed by both ultrasound and CT scan.\par He is scheduled to undergo carotid stenting under general anesthesia with a direct carotid access approach.\par Patient reports feeling okay overall but, aside from occasional orthostatic episodes with borderline low blood pressure he has been feeling well.\par He reports taking the aspirin and Plavix as directed.\par He is able to walk 3 miles without any chest pains or shortness of breath.\par He has had no anginal symptoms since his stenting in 2009.\par Past medical history is notable for:\par He has been dealing with insulin requiring diabetes mellitus for 15 years.\par He has no history of congestive heart failure, known stroke and quit smoking many years ago.\par He takes medication for BPH and reports some lightheadedness or dizziness when standing up quickly.\par

## 2021-10-13 NOTE — DISCUSSION/SUMMARY
[FreeTextEntry1] : He is a 81-year-old with a history of remote coronary artery disease status post stenting, diabetes mellitus, hypertension, hyperlipidemia and a TIA, now status post left internal carotid stenting.  \par Now s/p LCx stenting with diffuse residual CAD.\par He is still somewhat orthostatic but he remains symptom free. No falls.\par Some concern re: Flomax as the source of orthostasis. \par Will discuss with Dr. Mcgill.\par

## 2021-10-20 RX ORDER — TAMSULOSIN HYDROCHLORIDE 0.4 MG/1
0.4 CAPSULE ORAL
Refills: 0 | Status: DISCONTINUED | COMMUNITY
End: 2021-10-20

## 2022-06-08 ENCOUNTER — APPOINTMENT (OUTPATIENT)
Age: 82
End: 2022-06-08

## 2022-06-15 ENCOUNTER — NON-APPOINTMENT (OUTPATIENT)
Age: 82
End: 2022-06-15

## 2022-06-15 ENCOUNTER — APPOINTMENT (OUTPATIENT)
Dept: CARDIOLOGY | Facility: CLINIC | Age: 82
End: 2022-06-15
Payer: MEDICARE

## 2022-06-15 VITALS
WEIGHT: 193 LBS | HEART RATE: 65 BPM | DIASTOLIC BLOOD PRESSURE: 80 MMHG | SYSTOLIC BLOOD PRESSURE: 150 MMHG | BODY MASS INDEX: 28.5 KG/M2 | OXYGEN SATURATION: 97 %

## 2022-06-15 PROCEDURE — 99214 OFFICE O/P EST MOD 30 MIN: CPT

## 2022-06-15 PROCEDURE — 93000 ELECTROCARDIOGRAM COMPLETE: CPT

## 2022-06-15 RX ORDER — ATORVASTATIN CALCIUM 40 MG/1
40 TABLET, FILM COATED ORAL
Qty: 1 | Refills: 3 | Status: ACTIVE | COMMUNITY
Start: 2021-01-26 | End: 1900-01-01

## 2022-06-15 RX ORDER — CLOPIDOGREL BISULFATE 75 MG/1
75 TABLET, FILM COATED ORAL
Qty: 90 | Refills: 3 | Status: ACTIVE | COMMUNITY
Start: 2021-02-04 | End: 1900-01-01

## 2022-06-15 NOTE — PHYSICAL EXAM
[General Appearance - Well Developed] : well developed [General Appearance - Well Nourished] : well nourished [General Appearance - In No Acute Distress] : no acute distress [Normal Conjunctiva] : the conjunctiva exhibited no abnormalities [Normal Jugular Venous V Waves Present] : normal jugular venous V waves present [Respiration, Rhythm And Depth] : normal respiratory rhythm and effort [Heart Sounds] : normal S1 and S2 [Auscultation Breath Sounds / Voice Sounds] : lungs were clear to auscultation bilaterally [Arterial Pulses Normal] : the arterial pulses were normal [Edema] : no peripheral edema present [Regular] : the rhythm was regular [Bowel Sounds] : normal bowel sounds [Abdomen Soft] : soft [Abnormal Walk] : normal gait [Cyanosis, Localized] : no localized cyanosis [Skin Turgor] : normal skin turgor [Oriented To Time, Place, And Person] : oriented to person, place, and time [Affect] : the affect was normal

## 2022-06-15 NOTE — CARDIOLOGY SUMMARY
[de-identified] : Echocardiogram showed normal left ventricular systolic function without any out any significant valvular abnormalities.  There is mild to moderate aortic insufficiency without aortic stenosis.\par

## 2022-06-15 NOTE — DISCUSSION/SUMMARY
[FreeTextEntry1] : He is a 82-year-old with a history of remote coronary artery disease status post stenting, diabetes mellitus, hypertension, hyperlipidemia and a TIA, now status post left internal carotid stenting.  \par Now s/p LCx stenting with diffuse residual CAD. No anginal symtpoms.\par He didn’t tolerate SGLT1 inhibitor and ARB due to orthostasis. \par Continue DAPT.\par LDL at goal. Continue Lipitor.\par Exercise and weight loss.

## 2022-06-15 NOTE — HISTORY OF PRESENT ILLNESS
[FreeTextEntry1] : Felt good gardening. No chest pain or dyspnea.\par No bleeding or bruising.\par Occasional orthostatic episodes.\par Tie tingling.\par A1C is 9.3\par \par Prior:\par Stopped Farxiga. \par No further dizziness.\par No chest pain or dyspnea.\par No lab abnormalities.\par \par Prior:\par Felt fine.\par Did have some stressful days with headaches. BP was high (190/90's at home) took valsartan for 2 days and none since. Did feel dizzy after the second day.\par No dizziness when he stands up.\par \par \par Prior:\par He is accompanied by his daughter.\par s/p coronaory stents to the OM 1 and 2.\par Still fatigued. Some lightheadedness and dizziness. NO falls.\par Taking Farxiga and flowmax.\par \par \par He reports that he has been having shortness of breath when he is taking in his garden.  There is no associated chest pain.  He has no orthopnea, PND but does note leg edema at the end of the day.  He was told to drink lots of water because of his renal insufficiency.\par He is taking his medications as directed.\par \par Prior:\par He was seen today for cardiovascular evaluation prior to his planned carotid stenting.\par He has severe left carotid disease diagnosed by both ultrasound and CT scan.\par He is scheduled to undergo carotid stenting under general anesthesia with a direct carotid access approach.\par Patient reports feeling okay overall but, aside from occasional orthostatic episodes with borderline low blood pressure he has been feeling well.\par He reports taking the aspirin and Plavix as directed.\par He is able to walk 3 miles without any chest pains or shortness of breath.\par He has had no anginal symptoms since his stenting in 2009.\par Past medical history is notable for:\par He has been dealing with insulin requiring diabetes mellitus for 15 years.\par He has no history of congestive heart failure, known stroke and quit smoking many years ago.\par He takes medication for BPH and reports some lightheadedness or dizziness when standing up quickly.\par

## 2022-07-13 ENCOUNTER — TRANSCRIPTION ENCOUNTER (OUTPATIENT)
Age: 82
End: 2022-07-13

## 2022-08-19 NOTE — DISCHARGE NOTE PROVIDER - NSDCCPGOAL_GEN_ALL_CORE_FT
PCP: Shahana Brown MD    Last appt: 6/20/2019  Future Appointments   Date Time Provider Zack Davis   9/19/2019  8:45 AM Raphael Moreno MD [facility]       Requested Prescriptions     Pending Prescriptions Disp Refills    triamterene-hydroCHLOROthiazide (MAXZIDE) 37.5-25 mg per tablet [Pharmacy Med Name: Lou Tierney 37.5-25 MG TB] 90 Tab 0     Sig: TAKE 1 TABLET BY MOUTH EVERY DAY       Prior labs and Blood pressures:  BP Readings from Last 3 Encounters:   06/20/19 138/85   03/20/19 142/82   09/20/18 131/83     Lab Results   Component Value Date/Time    Sodium 138 03/20/2019 09:46 AM    Potassium 4.4 03/20/2019 09:46 AM    Chloride 99 03/20/2019 09:46 AM    CO2 24 03/20/2019 09:46 AM    Anion gap 7 06/14/2010 09:48 AM    Glucose 147 (H) 03/20/2019 09:46 AM    BUN 17 03/20/2019 09:46 AM    Creatinine 0.81 03/20/2019 09:46 AM    BUN/Creatinine ratio 21 03/20/2019 09:46 AM    GFR est AA 91 03/20/2019 09:46 AM    GFR est non-AA 79 03/20/2019 09:46 AM    Calcium 9.3 03/20/2019 09:46 AM     Lab Results   Component Value Date/Time    Hemoglobin A1c 8.2 (H) 05/13/2015 08:17 AM    Hemoglobin A1c (POC) 8.2 06/20/2019 09:00 AM     Lab Results   Component Value Date/Time    Cholesterol, total 173 03/20/2019 09:46 AM    HDL Cholesterol 64 03/20/2019 09:46 AM    LDL, calculated 92 03/20/2019 09:46 AM    VLDL, calculated 17 03/20/2019 09:46 AM    Triglyceride 83 03/20/2019 09:46 AM    CHOL/HDL Ratio 2.4 06/14/2010 09:48 AM     Lab Results   Component Value Date/Time    Vitamin D 25-Hydroxy 35.2 08/08/2011 09:21 AM    VITAMIN D, 25-HYDROXY 36.7 03/20/2019 09:46 AM       Lab Results   Component Value Date/Time    TSH 1.660 03/20/2019 09:46 AM
ambulatory
To get better and follow your care plan as instructed.

## 2022-11-21 ENCOUNTER — NON-APPOINTMENT (OUTPATIENT)
Age: 82
End: 2022-11-21

## 2022-11-21 ENCOUNTER — APPOINTMENT (OUTPATIENT)
Dept: CARDIOLOGY | Facility: CLINIC | Age: 82
End: 2022-11-21
Payer: MEDICARE

## 2022-11-21 VITALS
HEART RATE: 71 BPM | BODY MASS INDEX: 28.5 KG/M2 | DIASTOLIC BLOOD PRESSURE: 70 MMHG | SYSTOLIC BLOOD PRESSURE: 130 MMHG | OXYGEN SATURATION: 98 % | WEIGHT: 193 LBS

## 2022-11-21 PROCEDURE — 99214 OFFICE O/P EST MOD 30 MIN: CPT | Mod: 25

## 2022-11-21 PROCEDURE — 93000 ELECTROCARDIOGRAM COMPLETE: CPT

## 2022-11-21 NOTE — DISCUSSION/SUMMARY
[FreeTextEntry1] : He is a 82-year-old with a history of remote coronary artery disease status post stenting, diabetes mellitus, hypertension, hyperlipidemia and a TIA, now status post left internal carotid stenting.  \par \par HTN: He didn’t tolerate SGLT1 inhibitor and ARB due to orthostasis. \par CAD: Now s/p LCx stenting with diffuse residual CAD. No anginal symptoms either. Continue DAPT.\par LDL at goal. Continue Lipitor.\par TIA: continue aggressive medical therapy.\par Exercise and weight loss. [EKG obtained to assist in diagnosis and management of assessed problem(s)] : EKG obtained to assist in diagnosis and management of assessed problem(s)

## 2022-11-21 NOTE — CARDIOLOGY SUMMARY
[de-identified] : Echocardiogram showed normal left ventricular systolic function without any out any significant valvular abnormalities.  There is mild to moderate aortic insufficiency without aortic stenosis.\par

## 2022-11-21 NOTE — HISTORY OF PRESENT ILLNESS
[FreeTextEntry1] : No further dizziness. No palpitations.\par Improved blood sugar.\par No bleeding or bruising.\par \par \par Prior:\par Felt good gardening. No chest pain or dyspnea.\par No bleeding or bruising.\par Occasional orthostatic episodes.\par Tie tingling.\par A1C is 9.3\par \par Prior:\par Stopped Farxiga. \par No further dizziness.\par No chest pain or dyspnea.\par No lab abnormalities.\par \par Prior:\par Felt fine.\par Did have some stressful days with headaches. BP was high (190/90's at home) took valsartan for 2 days and none since. Did feel dizzy after the second day.\par No dizziness when he stands up.\par \par \par Prior:\par He is accompanied by his daughter.\par s/p coronaory stents to the OM 1 and 2.\par Still fatigued. Some lightheadedness and dizziness. NO falls.\par Taking Farxiga and flowmax.\par \par \par He reports that he has been having shortness of breath when he is taking in his garden.  There is no associated chest pain.  He has no orthopnea, PND but does note leg edema at the end of the day.  He was told to drink lots of water because of his renal insufficiency.\par He is taking his medications as directed.\par \par Prior:\par He was seen today for cardiovascular evaluation prior to his planned carotid stenting.\par He has severe left carotid disease diagnosed by both ultrasound and CT scan.\par He is scheduled to undergo carotid stenting under general anesthesia with a direct carotid access approach.\par Patient reports feeling okay overall but, aside from occasional orthostatic episodes with borderline low blood pressure he has been feeling well.\par He reports taking the aspirin and Plavix as directed.\par He is able to walk 3 miles without any chest pains or shortness of breath.\par He has had no anginal symptoms since his stenting in 2009.\par Past medical history is notable for:\par He has been dealing with insulin requiring diabetes mellitus for 15 years.\par He has no history of congestive heart failure, known stroke and quit smoking many years ago.\par He takes medication for BPH and reports some lightheadedness or dizziness when standing up quickly.\par

## 2023-03-30 ENCOUNTER — APPOINTMENT (OUTPATIENT)
Dept: VASCULAR SURGERY | Facility: CLINIC | Age: 83
End: 2023-03-30
Payer: MEDICARE

## 2023-03-30 VITALS
SYSTOLIC BLOOD PRESSURE: 150 MMHG | BODY MASS INDEX: 28.32 KG/M2 | HEIGHT: 68.5 IN | DIASTOLIC BLOOD PRESSURE: 85 MMHG | WEIGHT: 189 LBS | HEART RATE: 61 BPM

## 2023-03-30 VITALS — DIASTOLIC BLOOD PRESSURE: 80 MMHG | HEART RATE: 63 BPM | SYSTOLIC BLOOD PRESSURE: 150 MMHG | TEMPERATURE: 97.8 F

## 2023-03-30 DIAGNOSIS — I65.22 OCCLUSION AND STENOSIS OF LEFT CAROTID ARTERY: ICD-10-CM

## 2023-03-30 PROCEDURE — 99213 OFFICE O/P EST LOW 20 MIN: CPT

## 2023-03-30 PROCEDURE — 93880 EXTRACRANIAL BILAT STUDY: CPT

## 2023-04-01 PROBLEM — I65.22 ATHEROSCLEROSIS OF LEFT CAROTID ARTERY: Status: ACTIVE | Noted: 2021-02-01

## 2023-04-01 NOTE — PHYSICAL EXAM
[2+] : left 2+ [Alert] : alert [Oriented to Person] : oriented to person [Oriented to Place] : oriented to place [Oriented to Time] : oriented to time [Calm] : calm [de-identified] : well appearing male, NAD  [de-identified] : L TCAR incision well healed [de-identified] : unlabored

## 2023-04-01 NOTE — ASSESSMENT
[FreeTextEntry1] : A/P:\par \par Carotid artery disease with TIA s/p L TCAR which remains patent and without restenosis on duplex. Recent witnessed fall at home reportedly without injury but I am concerned that he cannot recall specific details. ?Slurred speech per daughter which patient denies and states is his baseline. Difficult to ascertain true symptoms as patient cannot provide details regarding his condition. We discussed importance of symptoms surveillance and agreed to proceed with CTH to r/o stroke/injury given recent fall and ?slurred speech. \par \par Patient also reports unbalance gait but he and his daughter do not agree on details. I advised that given this constellation of symptoms, he really should speak with his PMD and/or seek neurology evaluation. \par \par Daughter to schedule CTH. TTM provided to discuss results.

## 2023-04-01 NOTE — HISTORY OF PRESENT ILLNESS
[FreeTextEntry1] : YADIRA SALTER (: May 21 1940) is a 82 year old male who presents today for follow up visit. Last evaluation noted in . \par \par Patient had recent trip and fall witnessed by his wife. Events relayed by patient and his daughter today - patient was walking and suddenly fell. He states that his fall was braced by stairs. He denies any injury to his body. He is having trouble recalling other details related to the fall. He recalls getting up from falling. His daughter checked his BP and blood glucose after the event and states that everything was "normal." He did not go to the hospital. Patient denies lightheadedness, dizziness. Per daughter, they are going to see all his providers to make sure "everything is okay."\par \par Patient has a history of TIA in  (facial droop and slurred speech) and is s/p L TCAR (3/2021). \par Daughter has concerns that patient is still slurring words. Unclear when this started. Daughter and patient are not in agreement with complaints. \par Otherwise denies amaurosis fugax, facial droop, and weakness in the arms/legs. \par Patient complains that he feels lightheaded when his "blood pressure goes down." He states that this happens "once in a blue moon."\par Compliant with Plavix, aspirin, and atorvastatin. \par \par PMH: HTN, CAD s/p coronary stent, IDDM, HLD, BPH, peripheral neuropathy, bilateral lens placement, carotid artery disease s/p L TCAR (3/2021)\par \par 3/2023 carotid duplex\par mild FELICITAS \par patent LICA s/p TCAR\par R vertebral artery with oscillating flow (seen in previous u/s)

## 2023-04-26 ENCOUNTER — APPOINTMENT (OUTPATIENT)
Dept: VASCULAR SURGERY | Facility: CLINIC | Age: 83
End: 2023-04-26

## 2023-04-26 ENCOUNTER — NON-APPOINTMENT (OUTPATIENT)
Age: 83
End: 2023-04-26

## 2023-05-05 ENCOUNTER — APPOINTMENT (OUTPATIENT)
Dept: CT IMAGING | Facility: IMAGING CENTER | Age: 83
End: 2023-05-05
Payer: MEDICARE

## 2023-05-05 ENCOUNTER — OUTPATIENT (OUTPATIENT)
Dept: OUTPATIENT SERVICES | Facility: HOSPITAL | Age: 83
LOS: 1 days | End: 2023-05-05
Payer: MEDICARE

## 2023-05-05 DIAGNOSIS — Z98.890 OTHER SPECIFIED POSTPROCEDURAL STATES: Chronic | ICD-10-CM

## 2023-05-05 DIAGNOSIS — G45.9 TRANSIENT CEREBRAL ISCHEMIC ATTACK, UNSPECIFIED: ICD-10-CM

## 2023-05-05 DIAGNOSIS — Z95.828 PRESENCE OF OTHER VASCULAR IMPLANTS AND GRAFTS: Chronic | ICD-10-CM

## 2023-05-05 DIAGNOSIS — Z98.49 CATARACT EXTRACTION STATUS, UNSPECIFIED EYE: Chronic | ICD-10-CM

## 2023-05-05 PROCEDURE — 70450 CT HEAD/BRAIN W/O DYE: CPT

## 2023-05-05 PROCEDURE — 70450 CT HEAD/BRAIN W/O DYE: CPT | Mod: 26

## 2023-06-07 ENCOUNTER — APPOINTMENT (OUTPATIENT)
Dept: VASCULAR SURGERY | Facility: CLINIC | Age: 83
End: 2023-06-07

## 2023-06-07 NOTE — HISTORY OF PRESENT ILLNESS
[FreeTextEntry1] : INCOMPLETE NOTE....\par \par YADIRA SALTER (: May 21 1940) \par \par Patient contacted office that he had gone for non-contrast CT of the head. Report from 2023 as follows: \par "Age-appropriate involutional and ischemic gliotic changes. No hemorrhage. No significant change since 2021"\par \par Today the patient reports ______ \par \par He has a history of TIA and is s/p L TCAR (3/2021). Stent patent on most recent carotid duplex from 3/2023. Repeat duplex in 6 months (due 2023).\par \par 2023 patient not available for TTM. S/W daughter. Will call  at 10AM

## 2023-06-12 ENCOUNTER — NON-APPOINTMENT (OUTPATIENT)
Age: 83
End: 2023-06-12

## 2023-08-25 NOTE — ASU PATIENT PROFILE, ADULT - MENTAL HEALTH CONDITIONS/SYMPTOMS, PROFILE
Detail Level: Detailed Render Post-Care Instructions In Note?: no Show Aperture Variable?: Yes Duration Of Freeze Thaw-Cycle (Seconds): 3 Number Of Freeze-Thaw Cycles: 2 freeze-thaw cycles Post-Care Instructions: I reviewed with the patient in detail post-care instructions. Patient is to wear sunprotection, and avoid picking at any of the treated lesions. Pt may apply Vaseline to crusted or scabbing areas. Consent: The patient's consent was obtained including but not limited to risks of crusting, scabbing, blistering, scarring, darker or lighter pigmentary change, recurrence, incomplete removal and infection. none Medical Necessity Clause: This procedure was medically necessary because the lesions that were treated were: Spray Paint Text: The liquid nitrogen was applied to the skin utilizing a spray paint frosting technique. Medical Necessity Information: It is in your best interest to select a reason for this procedure from the list below. All of these items fulfill various CMS LCD requirements except the new and changing color options.

## 2023-08-31 ENCOUNTER — APPOINTMENT (OUTPATIENT)
Dept: MRI IMAGING | Facility: CLINIC | Age: 83
End: 2023-08-31

## 2023-09-29 ENCOUNTER — APPOINTMENT (OUTPATIENT)
Dept: MRI IMAGING | Facility: CLINIC | Age: 83
End: 2023-09-29

## 2023-10-25 ENCOUNTER — OUTPATIENT (OUTPATIENT)
Dept: OUTPATIENT SERVICES | Facility: HOSPITAL | Age: 83
LOS: 1 days | End: 2023-10-25
Payer: COMMERCIAL

## 2023-10-25 ENCOUNTER — APPOINTMENT (OUTPATIENT)
Dept: MRI IMAGING | Facility: CLINIC | Age: 83
End: 2023-10-25
Payer: MEDICARE

## 2023-10-25 DIAGNOSIS — Z98.49 CATARACT EXTRACTION STATUS, UNSPECIFIED EYE: Chronic | ICD-10-CM

## 2023-10-25 DIAGNOSIS — Z00.8 ENCOUNTER FOR OTHER GENERAL EXAMINATION: ICD-10-CM

## 2023-10-25 DIAGNOSIS — Z98.890 OTHER SPECIFIED POSTPROCEDURAL STATES: Chronic | ICD-10-CM

## 2023-10-25 DIAGNOSIS — Z95.828 PRESENCE OF OTHER VASCULAR IMPLANTS AND GRAFTS: Chronic | ICD-10-CM

## 2023-10-25 PROCEDURE — 70551 MRI BRAIN STEM W/O DYE: CPT | Mod: 26

## 2023-10-25 PROCEDURE — 70551 MRI BRAIN STEM W/O DYE: CPT

## 2023-10-30 ENCOUNTER — APPOINTMENT (OUTPATIENT)
Dept: VASCULAR SURGERY | Facility: CLINIC | Age: 83
End: 2023-10-30
Payer: MEDICARE

## 2023-10-30 PROCEDURE — 93880 EXTRACRANIAL BILAT STUDY: CPT

## 2023-11-03 ENCOUNTER — APPOINTMENT (OUTPATIENT)
Dept: VASCULAR SURGERY | Facility: CLINIC | Age: 83
End: 2023-11-03
Payer: MEDICARE

## 2023-11-03 PROCEDURE — 99441: CPT

## 2023-12-13 ENCOUNTER — APPOINTMENT (OUTPATIENT)
Dept: CARDIOLOGY | Facility: CLINIC | Age: 83
End: 2023-12-13
Payer: MEDICARE

## 2023-12-13 ENCOUNTER — NON-APPOINTMENT (OUTPATIENT)
Age: 83
End: 2023-12-13

## 2023-12-13 VITALS
OXYGEN SATURATION: 96 % | BODY MASS INDEX: 29.67 KG/M2 | WEIGHT: 198 LBS | DIASTOLIC BLOOD PRESSURE: 66 MMHG | HEART RATE: 77 BPM | SYSTOLIC BLOOD PRESSURE: 120 MMHG

## 2023-12-13 DIAGNOSIS — G45.9 TRANSIENT CEREBRAL ISCHEMIC ATTACK, UNSPECIFIED: ICD-10-CM

## 2023-12-13 DIAGNOSIS — I25.10 ATHEROSCLEROTIC HEART DISEASE OF NATIVE CORONARY ARTERY W/OUT ANGINA PECTORIS: ICD-10-CM

## 2023-12-13 PROCEDURE — 99214 OFFICE O/P EST MOD 30 MIN: CPT | Mod: 25

## 2023-12-13 PROCEDURE — 93000 ELECTROCARDIOGRAM COMPLETE: CPT

## 2023-12-13 NOTE — HISTORY OF PRESENT ILLNESS
[FreeTextEntry1] : Does less exercise than before. Walks for 10 min without chest pain or dyspnea. No orthopnea. Mild swelling on  occasionally in the ankles. No bleeding or bruising. Sugar control improve A1c below 7 Left upper quadrant, sharp pain is releived with belching. Not similary to angina.  Prior: 'No further dizziness. No palpitations. Improved blood sugar. No bleeding or bruising.  Prior: Felt good gardening. No chest pain or dyspnea. No bleeding or bruising. Occasional orthostatic episodes. Tie tingling. A1C is 9.3  Prior: Stopped Farxiga.  No further dizziness. No chest pain or dyspnea. No lab abnormalities.  Prior: Felt fine. Did have some stressful days with headaches. BP was high (190/90's at home) took valsartan for 2 days and none since. Did feel dizzy after the second day. No dizziness when he stands up.   Prior: He is accompanied by his daughter. s/p coronaory stents to the OM 1 and 2. Still fatigued. Some lightheadedness and dizziness. NO falls. Taking Farxiga and flowmax.   He reports that he has been having shortness of breath when he is taking in his garden.  There is no associated chest pain.  He has no orthopnea, PND but does note leg edema at the end of the day.  He was told to drink lots of water because of his renal insufficiency. He is taking his medications as directed.  Prior: He was seen today for cardiovascular evaluation prior to his planned carotid stenting. He has severe left carotid disease diagnosed by both ultrasound and CT scan. He is scheduled to undergo carotid stenting under general anesthesia with a direct carotid access approach. Patient reports feeling okay overall but, aside from occasional orthostatic episodes with borderline low blood pressure he has been feeling well. He reports taking the aspirin and Plavix as directed. He is able to walk 3 miles without any chest pains or shortness of breath. He has had no anginal symptoms since his stenting in 2009. Past medical history is notable for: He has been dealing with insulin requiring diabetes mellitus for 15 years. He has no history of congestive heart failure, known stroke and quit smoking many years ago. He takes medication for BPH and reports some lightheadedness or dizziness when standing up quickly.

## 2023-12-13 NOTE — CARDIOLOGY SUMMARY
[de-identified] : Echocardiogram showed normal left ventricular systolic function without any out any significant valvular abnormalities.  There is mild to moderate aortic insufficiency without aortic stenosis.\par

## 2023-12-13 NOTE — DISCUSSION/SUMMARY
[FreeTextEntry1] : He is a 83-year-old with a history of remote coronary artery disease status post stenting, diabetes mellitus, hypertension, hyperlipidemia and a TIA, now status post left internal carotid stenting.    HTN: Well controlled. He didn't tolerate SGLT1 inhibitor and ARB due to orthostasis.  CAD: Now s/p LCx stenting with diffuse residual CAD. No anginal symptoms either. Continue DAPT. Chest pains dont sound anginal. GI vs. Muscle. LDL was at goal. Continue Lipitor. Labs at PMD earlier today. TIA: continue aggressive medical therapy. DAPT   Encouraged exercise. Semianual follwoup [EKG obtained to assist in diagnosis and management of assessed problem(s)] : EKG obtained to assist in diagnosis and management of assessed problem(s)

## 2024-04-25 ENCOUNTER — APPOINTMENT (OUTPATIENT)
Dept: ORTHOPEDIC SURGERY | Facility: CLINIC | Age: 84
End: 2024-04-25
Payer: MEDICARE

## 2024-04-25 VITALS
HEIGHT: 70 IN | SYSTOLIC BLOOD PRESSURE: 187 MMHG | BODY MASS INDEX: 28.63 KG/M2 | HEART RATE: 60 BPM | DIASTOLIC BLOOD PRESSURE: 79 MMHG | WEIGHT: 200 LBS

## 2024-04-25 DIAGNOSIS — M43.16 SPONDYLOLISTHESIS, LUMBAR REGION: ICD-10-CM

## 2024-04-25 PROCEDURE — 72100 X-RAY EXAM L-S SPINE 2/3 VWS: CPT

## 2024-04-25 PROCEDURE — 99204 OFFICE O/P NEW MOD 45 MIN: CPT | Mod: 25

## 2024-04-25 NOTE — HISTORY OF PRESENT ILLNESS
[de-identified] : This 83-year-old male who denies a prior history of back problems is seen in the office today along with his daughter.  After a trip up to [place] and back where his daughter drove he injured his back helping his wife get out of the car.  She has a history of a prior stroke.  He is having right-sided lower back pain.  The pain does not radiate to his legs and he has not had lower extremity neurologic symptoms.  The pain is worse coughing and sneezing and slightly worse forcing to move his bowels.  There is no increase in his chronic constipation immediately after the onset of pain.  The pain is not predictably aggravated by sitting, driving, standing or walking.  It is worse with change of position and bending.  The symptoms worsen significantly after a massage but overall the pain that was a 10 after the massage is currently down to a 4 or 5.  He is using heat and taking 2 extra strength Tylenol twice a day.  He has had prior cataract surgery and a left-sided carotid stent.  He has been diabetic for 10 years and has been on insulin for 6 or 7 years.  The last hemoglobin A1c I found in the chart was 8.  He is on medication for blood pressure, a statin, Plavix, insulin and finasteride for his BPH. [Pain Location] : pain [4] : a minimum pain level of 4/10 [5] : a maximum pain level of 5/10 [Bending] : worsened by bending [Lifting] : worsened by lifting [Prolonged Sitting] : not worsened by prolonged sitting [Prolonged Standing] : not worsened by prolonged standing [Sitting] : not worsened by sitting [Standing] : not worsened by standing [Walking] : not worsened by walking

## 2024-04-25 NOTE — DISCUSSION/SUMMARY
[Medication Risks Reviewed] : Medication risks reviewed [de-identified] : His symptoms are currently improving.  He will continue to rest and increase the use of moist heat.  He will increase the extra strength Tylenol to 8 a day.  I will see him for follow-up in 3 weeks.  I discussed the risks of taking a traditional nonsteroidal anti-inflammatory as he is on Plavix.  If he fails to make sequential improvement we could consider putting him on meloxicam as it has a third of the risk of upper GI symptoms.  I am also reluctant put him on steroids as the most recent hemoglobin A1c that I see is 8.  He will call if the symptoms worsen.

## 2024-04-25 NOTE — PHYSICAL EXAM
[de-identified] : He is fully alert and oriented with a normal mood and affect.  He is in no acute distress as I take the history.  He ambulates with a normal gait including tiptoe and heel walking.  There are no antalgic or coxalgia components to his gait.  There are no cutaneous abnormalities or palpable bony defects of the spine.  There is no evidence of shortness of breath or respiratory distress.  There is no paravertebral muscle spasm but sidebending is limited.  There is no sciatic or trochanteric tenderness.  Forward flexion of the spine was painless today to 70 degrees.  His lower extremity neurological examination revealed absent reflexes including with reinforcement.  Motor power is normal to manual testing in all lower extremity groups and sensation is normal to light touch in all dermatomes.  Straight leg raising is negative to 90 degrees in the sitting position bilaterally.  There is discomfort with internal rotation of his right hip but otherwise there is no pain with range of motion of the hips.  Vascular examination shows no evidence of varicosities and there is no lymphedema. [de-identified] : I found an old CAT scan of the abdomen and pelvis from 4 years ago in the chart.  He had moderate multilevel degenerative changes with a degenerative spondylolisthesis at L4-5 and evidence of vascular calcification.  AP and lateral x-rays of the lumbar spine are obtained in the office today.  Again there are degenerative changes with disc space narrowing and a degenerative spondylolisthesis at L4-5.

## 2024-05-22 ENCOUNTER — APPOINTMENT (OUTPATIENT)
Dept: ORTHOPEDIC SURGERY | Facility: CLINIC | Age: 84
End: 2024-05-22
Payer: MEDICARE

## 2024-05-22 VITALS — BODY MASS INDEX: 28.63 KG/M2 | HEIGHT: 70 IN | WEIGHT: 200 LBS

## 2024-05-22 DIAGNOSIS — M54.50 LOW BACK PAIN, UNSPECIFIED: ICD-10-CM

## 2024-05-22 DIAGNOSIS — M47.816 SPONDYLOSIS W/OUT MYELOPATHY OR RADICULOPATHY, LUMBAR REGION: ICD-10-CM

## 2024-05-22 PROCEDURE — 99213 OFFICE O/P EST LOW 20 MIN: CPT

## 2024-05-22 NOTE — DISCUSSION/SUMMARY
[de-identified] : He had multiple questions about his arthritis and I explained it to him using his x-rays and a model.  He will continue to use moist heat and take Tylenol as needed.  His symptoms are dramatically better.  I will see him for follow-up on only a as needed basis.

## 2024-05-22 NOTE — PHYSICAL EXAM
[de-identified] : He is comfortable sitting today and straight leg raising is negative to 90 degrees bilaterally.

## 2024-05-22 NOTE — REASON FOR VISIT
[Follow-Up Visit] : a follow-up visit for [Degenerative Joint Disease] : degenerative joint disease [Back Pain] : back pain

## 2024-05-22 NOTE — HISTORY OF PRESENT ILLNESS
[de-identified] : I saw this 84-year-old male about 4 weeks ago with complaints of acute back pain after a trip to [place] and back and helping his wife get out of a car.  The pain was initially 10 but by the time I saw him he had improved to a 4 or 5.  He is on Plavix and he is also diabetic.  We do not have any blood work but speaking to him it sounds like he may have some compromise renal function.  I recommended only extra strength Tylenol.  The symptoms are now intermittent.  I told him he could take up to 8 extra strength Tylenol a day.  The symptoms are no worse than a 2 or 3 and he is taking only 2 extra strength Tylenol a day.

## 2024-08-05 NOTE — PHYSICAL EXAM
[General Appearance - Well Nourished] : well nourished 97.1 [General Appearance - Well Developed] : well developed [Normal Appearance] : normal appearance [Well Groomed] : well groomed [Edema] : no peripheral edema [General Appearance - In No Acute Distress] : no acute distress [Respiration, Rhythm And Depth] : normal respiratory rhythm and effort [Exaggerated Use Of Accessory Muscles For Inspiration] : no accessory muscle use [Abdomen Soft] : soft [Normal Station and Gait] : the gait and station were normal for the patient's age [] : no rash [No Focal Deficits] : no focal deficits [Oriented To Time, Place, And Person] : oriented to person, place, and time [Affect] : the affect was normal [Not Anxious] : not anxious [Mood] : the mood was normal [Urethral Meatus] : meatus normal [Urinary Bladder Findings] : the bladder was normal on palpation [Testes Mass (___cm)] : there were no testicular masses [Scrotum] : the scrotum was normal [No Palpable Adenopathy] : no palpable adenopathy

## 2024-12-09 NOTE — H&P PST ADULT - NSICDXPASTSURGICALHX_GEN_ALL_CORE_FT
Dr Marks (Cardiologist)  [phone]
PAST SURGICAL HISTORY:  H/O arthroscopy right knee    H/O cardiac catheterization 2 coronary stents    S/P cataract surgery

## 2025-03-07 NOTE — REASON FOR VISIT
[Home] : at home, [unfilled] , at the time of the visit. [Medical Office: ([facility])___] : at the medical office located in  [Verbal consent obtained from patient] : the patient, [unfilled] Recommend Initiate Ensure Max 11oz PO Daily (Provides 150kcal & 30grams of Protein)

## 2025-04-01 ENCOUNTER — APPOINTMENT (OUTPATIENT)
Dept: OTOLARYNGOLOGY | Facility: CLINIC | Age: 85
End: 2025-04-01

## 2025-04-10 ENCOUNTER — APPOINTMENT (OUTPATIENT)
Dept: OTOLARYNGOLOGY | Facility: CLINIC | Age: 85
End: 2025-04-10
Payer: MEDICARE

## 2025-04-10 VITALS
DIASTOLIC BLOOD PRESSURE: 71 MMHG | WEIGHT: 190 LBS | BODY MASS INDEX: 27.51 KG/M2 | SYSTOLIC BLOOD PRESSURE: 120 MMHG | HEIGHT: 69.5 IN | HEART RATE: 85 BPM

## 2025-04-10 DIAGNOSIS — H90.3 SENSORINEURAL HEARING LOSS, BILATERAL: ICD-10-CM

## 2025-04-10 DIAGNOSIS — H93.293 OTHER ABNORMAL AUDITORY PERCEPTIONS, BILATERAL: ICD-10-CM

## 2025-04-10 DIAGNOSIS — H61.23 IMPACTED CERUMEN, BILATERAL: ICD-10-CM

## 2025-04-10 PROCEDURE — 92567 TYMPANOMETRY: CPT

## 2025-04-10 PROCEDURE — 99203 OFFICE O/P NEW LOW 30 MIN: CPT | Mod: 25

## 2025-04-10 PROCEDURE — 92557 COMPREHENSIVE HEARING TEST: CPT

## 2025-05-13 ENCOUNTER — APPOINTMENT (OUTPATIENT)
Dept: GASTROENTEROLOGY | Facility: CLINIC | Age: 85
End: 2025-05-13

## 2025-05-20 ENCOUNTER — APPOINTMENT (OUTPATIENT)
Dept: CARDIOLOGY | Facility: CLINIC | Age: 85
End: 2025-05-20

## 2025-05-22 ENCOUNTER — APPOINTMENT (OUTPATIENT)
Dept: CARDIOLOGY | Facility: CLINIC | Age: 85
End: 2025-05-22

## 2025-05-22 ENCOUNTER — APPOINTMENT (OUTPATIENT)
Dept: CARDIOLOGY | Facility: CLINIC | Age: 85
End: 2025-05-22
Payer: MEDICARE

## 2025-05-22 ENCOUNTER — NON-APPOINTMENT (OUTPATIENT)
Age: 85
End: 2025-05-22

## 2025-05-22 VITALS — HEART RATE: 64 BPM | WEIGHT: 194.5 LBS | OXYGEN SATURATION: 95 % | BODY MASS INDEX: 28.31 KG/M2

## 2025-05-22 VITALS — DIASTOLIC BLOOD PRESSURE: 80 MMHG | SYSTOLIC BLOOD PRESSURE: 145 MMHG

## 2025-05-22 DIAGNOSIS — I25.10 ATHEROSCLEROTIC HEART DISEASE OF NATIVE CORONARY ARTERY W/OUT ANGINA PECTORIS: ICD-10-CM

## 2025-05-22 DIAGNOSIS — G45.9 TRANSIENT CEREBRAL ISCHEMIC ATTACK, UNSPECIFIED: ICD-10-CM

## 2025-05-22 PROCEDURE — 99214 OFFICE O/P EST MOD 30 MIN: CPT

## 2025-05-22 PROCEDURE — 93000 ELECTROCARDIOGRAM COMPLETE: CPT

## 2025-06-19 ENCOUNTER — APPOINTMENT (OUTPATIENT)
Dept: CARDIOLOGY | Facility: CLINIC | Age: 85
End: 2025-06-19
Payer: MEDICARE

## 2025-06-19 VITALS
SYSTOLIC BLOOD PRESSURE: 120 MMHG | WEIGHT: 192 LBS | DIASTOLIC BLOOD PRESSURE: 75 MMHG | BODY MASS INDEX: 27.95 KG/M2 | HEART RATE: 70 BPM | OXYGEN SATURATION: 97 %

## 2025-06-19 PROBLEM — R09.89 LABILE HYPERTENSION: Status: ACTIVE | Noted: 2025-06-19

## 2025-06-19 PROCEDURE — 99214 OFFICE O/P EST MOD 30 MIN: CPT | Mod: 25

## 2025-06-19 PROCEDURE — 93000 ELECTROCARDIOGRAM COMPLETE: CPT

## 2025-06-19 RX ORDER — VALSARTAN 80 MG/1
80 TABLET, COATED ORAL
Qty: 90 | Refills: 3 | Status: ACTIVE | COMMUNITY
Start: 2025-06-19 | End: 1900-01-01

## 2025-07-16 ENCOUNTER — APPOINTMENT (OUTPATIENT)
Dept: PHYSICAL MEDICINE AND REHAB | Facility: CLINIC | Age: 85
End: 2025-07-16
Payer: MEDICARE

## 2025-07-16 ENCOUNTER — OUTPATIENT (OUTPATIENT)
Dept: OUTPATIENT SERVICES | Facility: HOSPITAL | Age: 85
LOS: 1 days | End: 2025-07-16
Payer: COMMERCIAL

## 2025-07-16 ENCOUNTER — APPOINTMENT (OUTPATIENT)
Dept: RADIOLOGY | Facility: CLINIC | Age: 85
End: 2025-07-16

## 2025-07-16 DIAGNOSIS — M17.12 UNILATERAL PRIMARY OSTEOARTHRITIS, LEFT KNEE: ICD-10-CM

## 2025-07-16 DIAGNOSIS — M17.11 UNILATERAL PRIMARY OSTEOARTHRITIS, RIGHT KNEE: ICD-10-CM

## 2025-07-16 DIAGNOSIS — Z98.890 OTHER SPECIFIED POSTPROCEDURAL STATES: Chronic | ICD-10-CM

## 2025-07-16 DIAGNOSIS — Z98.49 CATARACT EXTRACTION STATUS, UNSPECIFIED EYE: Chronic | ICD-10-CM

## 2025-07-16 DIAGNOSIS — Z95.828 PRESENCE OF OTHER VASCULAR IMPLANTS AND GRAFTS: Chronic | ICD-10-CM

## 2025-07-16 PROCEDURE — 73564 X-RAY EXAM KNEE 4 OR MORE: CPT

## 2025-07-16 PROCEDURE — 99204 OFFICE O/P NEW MOD 45 MIN: CPT

## 2025-07-16 PROCEDURE — 73564 X-RAY EXAM KNEE 4 OR MORE: CPT | Mod: 26,LT,RT

## 2025-07-30 ENCOUNTER — APPOINTMENT (OUTPATIENT)
Age: 85
End: 2025-07-30

## 2025-07-30 ENCOUNTER — APPOINTMENT (OUTPATIENT)
Dept: PHYSICAL MEDICINE AND REHAB | Facility: CLINIC | Age: 85
End: 2025-07-30
Payer: MEDICARE

## 2025-07-30 PROCEDURE — 76883 US NRV&ACC STRUX 1XTR COMPRE: CPT

## 2025-08-06 ENCOUNTER — APPOINTMENT (OUTPATIENT)
Dept: PHYSICAL MEDICINE AND REHAB | Facility: CLINIC | Age: 85
End: 2025-08-06
Payer: MEDICARE

## 2025-08-06 DIAGNOSIS — M17.11 UNILATERAL PRIMARY OSTEOARTHRITIS, RIGHT KNEE: ICD-10-CM

## 2025-08-06 DIAGNOSIS — M17.12 UNILATERAL PRIMARY OSTEOARTHRITIS, LEFT KNEE: ICD-10-CM

## 2025-08-06 PROCEDURE — 20611 DRAIN/INJ JOINT/BURSA W/US: CPT | Mod: 50

## 2025-09-03 ENCOUNTER — APPOINTMENT (OUTPATIENT)
Dept: PHYSICAL MEDICINE AND REHAB | Facility: CLINIC | Age: 85
End: 2025-09-03